# Patient Record
Sex: FEMALE | Race: WHITE | NOT HISPANIC OR LATINO | URBAN - METROPOLITAN AREA
[De-identification: names, ages, dates, MRNs, and addresses within clinical notes are randomized per-mention and may not be internally consistent; named-entity substitution may affect disease eponyms.]

---

## 2021-05-03 ENCOUNTER — INPATIENT (INPATIENT)
Facility: HOSPITAL | Age: 81
LOS: 8 days | Discharge: HOME | DRG: 309 | End: 2021-05-12
Attending: INTERNAL MEDICINE | Admitting: INTERNAL MEDICINE
Payer: MEDICARE

## 2021-05-03 VITALS
SYSTOLIC BLOOD PRESSURE: 201 MMHG | DIASTOLIC BLOOD PRESSURE: 97 MMHG | WEIGHT: 250 LBS | OXYGEN SATURATION: 95 % | HEART RATE: 113 BPM | RESPIRATION RATE: 22 BRPM | HEIGHT: 67 IN

## 2021-05-03 DIAGNOSIS — I48.91 UNSPECIFIED ATRIAL FIBRILLATION: ICD-10-CM

## 2021-05-03 LAB
ALBUMIN SERPL ELPH-MCNC: 3.6 G/DL — SIGNIFICANT CHANGE UP (ref 3.3–5)
ALP SERPL-CCNC: 92 U/L — SIGNIFICANT CHANGE UP (ref 40–120)
ALT FLD-CCNC: 13 U/L — SIGNIFICANT CHANGE UP (ref 10–45)
ANION GAP SERPL CALC-SCNC: 18 MMOL/L — HIGH (ref 5–17)
APTT BLD: 28.2 SEC — SIGNIFICANT CHANGE UP (ref 27.5–35.5)
AST SERPL-CCNC: 18 U/L — SIGNIFICANT CHANGE UP (ref 10–40)
BASOPHILS # BLD AUTO: 0.02 K/UL — SIGNIFICANT CHANGE UP (ref 0–0.2)
BASOPHILS NFR BLD AUTO: 0.2 % — SIGNIFICANT CHANGE UP (ref 0–2)
BILIRUB SERPL-MCNC: 0.3 MG/DL — SIGNIFICANT CHANGE UP (ref 0.2–1.2)
BUN SERPL-MCNC: 17 MG/DL — SIGNIFICANT CHANGE UP (ref 7–23)
CALCIUM SERPL-MCNC: 9 MG/DL — SIGNIFICANT CHANGE UP (ref 8.4–10.5)
CHLORIDE SERPL-SCNC: 100 MMOL/L — SIGNIFICANT CHANGE UP (ref 96–108)
CK MB CFR SERPL CALC: 1.9 NG/ML — SIGNIFICANT CHANGE UP (ref 0–3.8)
CK SERPL-CCNC: 62 U/L — SIGNIFICANT CHANGE UP (ref 25–170)
CO2 SERPL-SCNC: 20 MMOL/L — LOW (ref 22–31)
CREAT SERPL-MCNC: 0.73 MG/DL — SIGNIFICANT CHANGE UP (ref 0.5–1.3)
EOSINOPHIL # BLD AUTO: 0.04 K/UL — SIGNIFICANT CHANGE UP (ref 0–0.5)
EOSINOPHIL NFR BLD AUTO: 0.4 % — SIGNIFICANT CHANGE UP (ref 0–6)
GLUCOSE SERPL-MCNC: 117 MG/DL — HIGH (ref 70–99)
HCT VFR BLD CALC: 36.4 % — SIGNIFICANT CHANGE UP (ref 34.5–45)
HGB BLD-MCNC: 11.5 G/DL — SIGNIFICANT CHANGE UP (ref 11.5–15.5)
IMM GRANULOCYTES NFR BLD AUTO: 0.6 % — SIGNIFICANT CHANGE UP (ref 0–1.5)
INR BLD: 1.07 RATIO — SIGNIFICANT CHANGE UP (ref 0.88–1.16)
LIDOCAIN IGE QN: 21 U/L — SIGNIFICANT CHANGE UP (ref 7–60)
LYMPHOCYTES # BLD AUTO: 1.96 K/UL — SIGNIFICANT CHANGE UP (ref 1–3.3)
LYMPHOCYTES # BLD AUTO: 20.9 % — SIGNIFICANT CHANGE UP (ref 13–44)
MCHC RBC-ENTMCNC: 28.8 PG — SIGNIFICANT CHANGE UP (ref 27–34)
MCHC RBC-ENTMCNC: 31.6 GM/DL — LOW (ref 32–36)
MCV RBC AUTO: 91.2 FL — SIGNIFICANT CHANGE UP (ref 80–100)
MONOCYTES # BLD AUTO: 1.15 K/UL — HIGH (ref 0–0.9)
MONOCYTES NFR BLD AUTO: 12.3 % — SIGNIFICANT CHANGE UP (ref 2–14)
NEUTROPHILS # BLD AUTO: 6.15 K/UL — SIGNIFICANT CHANGE UP (ref 1.8–7.4)
NEUTROPHILS NFR BLD AUTO: 65.6 % — SIGNIFICANT CHANGE UP (ref 43–77)
NRBC # BLD: 0 /100 WBCS — SIGNIFICANT CHANGE UP (ref 0–0)
PLATELET # BLD AUTO: 285 K/UL — SIGNIFICANT CHANGE UP (ref 150–400)
POTASSIUM SERPL-MCNC: 4.2 MMOL/L — SIGNIFICANT CHANGE UP (ref 3.5–5.3)
POTASSIUM SERPL-SCNC: 4.2 MMOL/L — SIGNIFICANT CHANGE UP (ref 3.5–5.3)
PROT SERPL-MCNC: 7.6 G/DL — SIGNIFICANT CHANGE UP (ref 6–8.3)
PROTHROM AB SERPL-ACNC: 12.8 SEC — SIGNIFICANT CHANGE UP (ref 10.6–13.6)
RBC # BLD: 3.99 M/UL — SIGNIFICANT CHANGE UP (ref 3.8–5.2)
RBC # FLD: 13.6 % — SIGNIFICANT CHANGE UP (ref 10.3–14.5)
SARS-COV-2 RNA SPEC QL NAA+PROBE: SIGNIFICANT CHANGE UP
SODIUM SERPL-SCNC: 138 MMOL/L — SIGNIFICANT CHANGE UP (ref 135–145)
TROPONIN T, HIGH SENSITIVITY RESULT: 12 NG/L — SIGNIFICANT CHANGE UP (ref 0–51)
TROPONIN T, HIGH SENSITIVITY RESULT: 13 NG/L — SIGNIFICANT CHANGE UP (ref 0–51)
TROPONIN T, HIGH SENSITIVITY RESULT: 13 NG/L — SIGNIFICANT CHANGE UP (ref 0–51)
TSH SERPL-MCNC: 1.59 UIU/ML — SIGNIFICANT CHANGE UP (ref 0.27–4.2)
WBC # BLD: 9.38 K/UL — SIGNIFICANT CHANGE UP (ref 3.8–10.5)
WBC # FLD AUTO: 9.38 K/UL — SIGNIFICANT CHANGE UP (ref 3.8–10.5)

## 2021-05-03 PROCEDURE — 99285 EMERGENCY DEPT VISIT HI MDM: CPT | Mod: CS

## 2021-05-03 PROCEDURE — 71045 X-RAY EXAM CHEST 1 VIEW: CPT | Mod: 26

## 2021-05-03 PROCEDURE — 93010 ELECTROCARDIOGRAM REPORT: CPT

## 2021-05-03 RX ORDER — ATORVASTATIN CALCIUM 80 MG/1
20 TABLET, FILM COATED ORAL AT BEDTIME
Refills: 0 | Status: DISCONTINUED | OUTPATIENT
Start: 2021-05-03 | End: 2021-05-12

## 2021-05-03 RX ORDER — METOPROLOL TARTRATE 50 MG
50 TABLET ORAL THREE TIMES A DAY
Refills: 0 | Status: DISCONTINUED | OUTPATIENT
Start: 2021-05-03 | End: 2021-05-08

## 2021-05-03 RX ORDER — METFORMIN HYDROCHLORIDE 850 MG/1
1 TABLET ORAL
Qty: 0 | Refills: 0 | DISCHARGE

## 2021-05-03 RX ORDER — ROSUVASTATIN CALCIUM 5 MG/1
1 TABLET ORAL
Qty: 0 | Refills: 0 | DISCHARGE

## 2021-05-03 RX ORDER — DULOXETINE HYDROCHLORIDE 30 MG/1
60 CAPSULE, DELAYED RELEASE ORAL DAILY
Refills: 0 | Status: DISCONTINUED | OUTPATIENT
Start: 2021-05-03 | End: 2021-05-12

## 2021-05-03 RX ORDER — LEVOTHYROXINE SODIUM 125 MCG
50 TABLET ORAL DAILY
Refills: 0 | Status: DISCONTINUED | OUTPATIENT
Start: 2021-05-03 | End: 2021-05-12

## 2021-05-03 RX ORDER — METOPROLOL TARTRATE 50 MG
25 TABLET ORAL
Refills: 0 | Status: DISCONTINUED | OUTPATIENT
Start: 2021-05-03 | End: 2021-05-03

## 2021-05-03 RX ORDER — FAMOTIDINE 10 MG/ML
20 INJECTION INTRAVENOUS ONCE
Refills: 0 | Status: COMPLETED | OUTPATIENT
Start: 2021-05-03 | End: 2021-05-03

## 2021-05-03 RX ORDER — ENOXAPARIN SODIUM 100 MG/ML
110 INJECTION SUBCUTANEOUS
Refills: 0 | Status: DISCONTINUED | OUTPATIENT
Start: 2021-05-03 | End: 2021-05-06

## 2021-05-03 RX ORDER — DOCUSATE SODIUM 100 MG
1 CAPSULE ORAL
Qty: 0 | Refills: 0 | DISCHARGE

## 2021-05-03 RX ORDER — ASPIRIN/CALCIUM CARB/MAGNESIUM 324 MG
81 TABLET ORAL ONCE
Refills: 0 | Status: COMPLETED | OUTPATIENT
Start: 2021-05-03 | End: 2021-05-03

## 2021-05-03 RX ORDER — ASPIRIN/CALCIUM CARB/MAGNESIUM 324 MG
81 TABLET ORAL DAILY
Refills: 0 | Status: DISCONTINUED | OUTPATIENT
Start: 2021-05-03 | End: 2021-05-12

## 2021-05-03 RX ADMIN — Medication 50 MILLIGRAM(S): at 22:20

## 2021-05-03 RX ADMIN — FAMOTIDINE 20 MILLIGRAM(S): 10 INJECTION INTRAVENOUS at 12:38

## 2021-05-03 RX ADMIN — Medication 81 MILLIGRAM(S): at 12:38

## 2021-05-03 RX ADMIN — ATORVASTATIN CALCIUM 20 MILLIGRAM(S): 80 TABLET, FILM COATED ORAL at 22:20

## 2021-05-03 RX ADMIN — Medication 0.5 MILLIGRAM(S): at 14:56

## 2021-05-03 RX ADMIN — Medication 30 MILLILITER(S): at 12:38

## 2021-05-03 NOTE — CONSULT NOTE ADULT - SUBJECTIVE AND OBJECTIVE BOX
CHIEF COMPLAINT:Patient is a 81y old  Female who presents with a chief complaint of Anxiety with rapid heart rate. (03 May 2021 15:57)      HPI:  82 yo female PMH htn, hld, dm, anxiety, hypothyroidism presenting with feeling of anxiety and chest discomfort starting yesterday. Pt states she may have had spicy food and afterwards started to feel gas-like pressure in the center of her chest which would resolve with burping, however sensation would return. Today pt also felt as if she had a panic attack which she describes as a feeling as if she needs to go to sleep and lay down but unable to 2/2 racing thoughts. No cardiac workup in the past. Pt otherwise feels well no active CP currently. took on baby aspirin today. Pt states shes mostly concerned about her anxiety which she usually feels daily and has been worse over the past 6 months. Pt follows with psychiatrist and pcp regularly. no fevers, chills, nausea, vomiting, abdominal pain, diarrhea, constipation.       PAST MEDICAL & SURGICAL HISTORY:      MEDICATIONS  (STANDING):  aspirin enteric coated 81 milliGRAM(s) Oral daily  atorvastatin 20 milliGRAM(s) Oral at bedtime  DULoxetine 60 milliGRAM(s) Oral daily  enoxaparin Injectable 110 milliGRAM(s) SubCutaneous two times a day  levothyroxine 50 MICROGram(s) Oral daily  metoprolol tartrate 50 milliGRAM(s) Oral three times a day    MEDICATIONS  (PRN):      FAMILY HISTORY:      SOCIAL HISTORY:    [ ] Non-smoker  [ ] Smoker  [ ] Alcohol    Allergies    Allergy Status Unknown    Intolerances    	    REVIEW OF SYSTEMS:  CONSTITUTIONAL: No fever, weight loss, or fatigue  EYES: No eye pain, visual disturbances, or discharge  ENT:  No difficulty hearing, tinnitus, vertigo; No sinus or throat pain  NECK: No pain or stiffness  RESPIRATORY: No cough, wheezing, chills or hemoptysis; + Shortness of Breath  CARDIOVASCULAR: No chest pain, palpitations, passing out, dizziness, or leg swelling  GASTROINTESTINAL: No abdominal or epigastric pain. No nausea, vomiting, or hematemesis; No diarrhea or constipation. No melena or hematochezia.  GENITOURINARY: No dysuria, frequency, hematuria, or incontinence  NEUROLOGICAL: No headaches, memory loss, loss of strength, numbness, or tremors  SKIN: No itching, burning, rashes, or lesions   LYMPH Nodes: No enlarged glands  ENDOCRINE: No heat or cold intolerance; No hair loss  MUSCULOSKELETAL: No joint pain or swelling; No muscle, back, or extremity pain  PSYCHIATRIC: No depression, anxiety, mood swings, or difficulty sleeping  HEME/LYMPH: No easy bruising, or bleeding gums  ALLERGY AND IMMUNOLOGIC: No hives or eczema	    [ ] All others negative	  [ ] Unable to obtain    PHYSICAL EXAM:  T(C): 36.9 (05-03-21 @ 14:10), Max: 36.9 (05-03-21 @ 14:10)  HR: 91 (05-03-21 @ 14:10) (91 - 113)  BP: 131/63 (05-03-21 @ 14:10) (131/63 - 201/97)  RR: 20 (05-03-21 @ 14:10) (20 - 22)  SpO2: 98% (05-03-21 @ 14:10) (95% - 98%)  Wt(kg): --  I&O's Summary      Appearance: Normal	  HEENT:   Normal oral mucosa, PERRL, EOMI	  Lymphatic: No lymphadenopathy  Cardiovascular: Normal S1 S2, No JVD, + murmurs, No edema  Respiratory: Lungs clear to auscultation	  Psychiatry: A & O x 3, Mood & affect appropriate  Gastrointestinal:  Soft, Non-tender, + BS	  Skin: No rashes, No ecchymoses, No cyanosis	  Neurologic: Non-focal  Extremities: Normal range of motion, No clubbing, cyanosis or edema  Vascular: Peripheral pulses palpable 2+ bilaterally    TELEMETRY: 	    ECG:  	  RADIOLOGY:  OTHER: 	  	  LABS:	 	    CARDIAC MARKERS:                              11.5   9.38  )-----------( 285      ( 03 May 2021 13:03 )             36.4     05-03    138  |  100  |  17  ----------------------------<  117<H>  4.2   |  20<L>  |  0.73    Ca    9.0      03 May 2021 13:03  Mg     1.7     05-03    TPro  7.6  /  Alb  3.6  /  TBili  0.3  /  DBili  x   /  AST  18  /  ALT  13  /  AlkPhos  92  05-03    proBNP:   Lipid Profile:   HgA1c:   TSH:   PT/INR - ( 03 May 2021 13:03 )   PT: 12.8 sec;   INR: 1.07 ratio         PTT - ( 03 May 2021 13:03 )  PTT:28.2 sec    PREVIOUS DIAGNOSTIC TESTING:    :  < from: 12 Lead ECG (10.12.15 @ 10:11) >  Diagnosis Line NORMAL SINUS RHYTHM WITH SINUS ARRHYTHMIA  CANNOTRULE OUT ANTERIOR INFARCT , AGE UNDETERMINED  ABNORMAL ECG    < from: Xray Chest 1 View- PORTABLE-Urgent (Xray Chest 1 View- PORTABLE-Urgent .) (05.03.21 @ 14:03) >  The heart is normal in size. Lungs are clear. No pleural effusion. No pneumothorax. Degenerative changes of the thoracic spine.    < end of copied text >

## 2021-05-03 NOTE — H&P ADULT - ASSESSMENT
82 yo female PMH htn, hld, dm, anxiety, hypothyroidism presenting with feeling of anxiety and chest discomfort starting yesterday. Pt states she may have had spicy food and afterwards started to feel gas-like pressure in the center of her chest which would resolve with burping, however sensation would return. Today pt also felt as if she had a panic attack which she describes as a feeling as if she needs to go to sleep and lay down but unable to 2/2 racing thoughts. No cardiac workup in the past. Pt otherwise feels well no active CP currently. took on baby aspirin today. Pt states shes mostly concerned about her anxiety which she usually feels daily and has been worse over the past 6 months. Pt follows with psychiatrist and pcp regularly. no fevers, chills, nausea, vomiting, abdominal pain, diarrhea, constipation.    CV:  82 yo female PMH htn, hld, dm, anxiety, hypothyroidism presenting with feeling of anxiety and chest discomfort starting yesterday. Pt states she may have had spicy food and afterwards started to feel gas-like pressure in the center of her chest which would resolve with burping, however sensation would return. Today pt also felt as if she had a panic attack which she describes as a feeling as if she needs to go to sleep and lay down but unable to 2/2 racing thoughts. No cardiac workup in the past. Pt otherwise feels well no active CP currently. took on baby aspirin today. Pt states shes mostly concerned about her anxiety which she usually feels daily and has been worse over the past 6 months. Pt follows with psychiatrist and pcp regularly. no fevers, chills, nausea, vomiting, abdominal pain, diarrhea, constipation.    CV: New onset A.fib. Admit to tele. CHADVAC score 3. Will start Lovenox 110 mg bid, transition to NOAC later. TTE tomorrow, then   resting stress test next day. Initial trop negative x 2, repeat in AM. Hold ACE, increase Lopressor to 50 mg tid, continue   ASA 81 mg/day and Crestor 5 mg/day. Lipid panel in AM.      Endo: A1C in AM, hold metformen in hosptal, continue Synthyroid 50 mcg/day, TSH in AM.  Finger stick AC and HS.

## 2021-05-03 NOTE — H&P ADULT - NSHPPHYSICALEXAM_GEN_ALL_CORE
PHYSICAL EXAMINATION:  Vital Signs Last 24 Hrs  T(C): 36.9 (03 May 2021 14:10), Max: 36.9 (03 May 2021 14:10)  T(F): 98.4 (03 May 2021 14:10), Max: 98.4 (03 May 2021 14:10)  HR: 91 (03 May 2021 14:10) (91 - 113)  BP: 131/63 (03 May 2021 14:10) (131/63 - 201/97)  BP(mean): --  RR: 20 (03 May 2021 14:10) (20 - 22)  SpO2: 98% (03 May 2021 14:10) (95% - 98%)  CAPILLARY BLOOD GLUCOSE          GENERAL: NAD, well-groomed, well-developed  HEAD:  atraumatic, normocephalic  EYES: sclera anicteric  ENMT: mucous membranes moist  NECK: supple, No JVD  CHEST/LUNG: clear to auscultation bilaterally; no rales, rhonchi, or wheezing b/l  HEART: normal S1, S2  ABDOMEN: BS+, soft, ND, NT   EXTREMITIES:  pulses palpable; no clubbing, cyanosis, or edema b/l LEs  NEURO: awake, alert, interactive; moves all extremities  SKIN: no rashes or lesions PHYSICAL EXAMINATION:  Vital Signs Last 24 Hrs  T(C): 36.9 (03 May 2021 14:10), Max: 36.9 (03 May 2021 14:10)  T(F): 98.4 (03 May 2021 14:10), Max: 98.4 (03 May 2021 14:10)  HR: 91 (03 May 2021 14:10) (91 - 113)  BP: 131/63 (03 May 2021 14:10) (131/63 - 201/97)  BP(mean): --  RR: 20 (03 May 2021 14:10) (20 - 22)  SpO2: 98% (03 May 2021 14:10) (95% - 98%)  CAPILLARY BLOOD GLUCOSE          GENERAL: NAD, well-groomed, well-developed, seen in ER, comfortable, no CP or SOB  HEAD:  atraumatic, normocephalic  EYES: sclera anicteric  ENMT: mucous membranes moist  NECK: supple, No JVD  CHEST/LUNG: clear to auscultation bilaterally; no rales, rhonchi, or wheezing b/l  HEART: normal S1, S2  ABDOMEN: BS+, soft, ND, NT   EXTREMITIES:  pulses palpable; no clubbing, cyanosis, or edema b/l LEs  NEURO: awake, alert, interactive; moves all extremities  SKIN: no rashes or lesions

## 2021-05-03 NOTE — CONSULT NOTE ADULT - ASSESSMENT
82 yo female PMH htn, hld, dm, anxiety, hypothyroidism presenting with feeling of anxiety and chest discomfort starting yesterday. Pt states she may have had spicy food and afterwards started to feel gas-like pressure in the center of her chest which would resolve with burping, however sensation would return. Today pt also felt as if she had a panic attack which she describes as a feeling as if she needs to go to sleep and lay down but unable to 2/2 racing thoughts. No cardiac workup in the past. Pt otherwise feels well no active CP currently. took on baby aspirin today. Pt states shes mostly concerned about her anxiety which she usually feels daily and has been worse over the past 6 months. Pt follows with psychiatrist and pcp regularly. no fevers, chills, nausea, vomiting, abdominal pain, diarrhea, constipation.   pt with sig cardiac risk factor with hx of htn increase cholesterol with palpitation and new onset of a.fib.  tele  beta blocker to control hr  tsh  lipid panel  cardiac enzymes  echo  will consider ischemia work up based on echo results

## 2021-05-03 NOTE — ED PROVIDER NOTE - ATTENDING CONTRIBUTION TO CARE
RGUJRAL 80yo f hx listed presents with chest pain and anxiety. States she has history of anxiety and has increased symptoms x 1 week. Pt also reports chest tightness and belching. No SOB, palpitations. Denies any thoughts of SI/HI. Non smoker, denies alcohol. Pt denies history of atrial fibrillation.  On exam, Patient is awake, alert and oriented x 3.  Patient is well appearing and in no acute distress.  NCAT  Neck is supple  Lungs are CTA B/L,+S1S2 no murmurs,  Abdomen:Soft nd/obese/nt+bs no rebound or guarding.  Extremity no edema or calf tender.  Skin with no rash.  Neuro CN3-12 intact. Strength 5/5 in upper and lower extremities. Nml Sensation.  Patient w new onset afib. No prior cardiac work up. Check labs, eval for ACS, arrhythmia.   Discussed with patient to start anticoagulation, pt declined any "blood thinners" at this time. States she will only take aspirin at this time. Risk discussed including cva. Pt states she will reconsider during admission.

## 2021-05-03 NOTE — ED ADULT NURSE NOTE - OBJECTIVE STATEMENT
81 yr old female to ED via EMS with c/o feeling worsening anxiety x 6 months with assoc "gas like chest " epigastric rad upwards. Relieved with belching. Reoccurring and intermittent. Pt awake alert and orientedx3 Resp even and nonlab Denies SOB. Sees a psychiatrist on Cymbalta, Denies SI. Anxiety starting at 5-6 am every morning. Talked with psych to poss increase Cymbalta. Has H/o Hypothyroid HTN HLD DM2 on Metformin.

## 2021-05-03 NOTE — ED PROVIDER NOTE - OBJECTIVE STATEMENT
80 yo female with pmh htn, hld, dm, anxiety presenting with feeling of anxiety and chest discomfort starting yesterday. 82 yo female with pmh htn, hld, dm, anxiety presenting with feeling of anxiety and chest discomfort starting yesterday. Pt states she may have had spicy food and afterwards started to feel gas-like pressure in the center of her chest which would resolve with burping, however 82 yo female with pmh htn, hld, dm, anxiety, hypothyroidism presenting with feeling of anxiety and chest discomfort starting yesterday. Pt states she may have had spicy food and afterwards started to feel gas-like pressure in the center of her chest which would resolve with burping, however sensation would return. Today pt also felt as if she had a panic attack which she describes as a feeling as if she needs to go to sleep and lay down but unable to 2/2 racing thoughts. No si/hi. no cardiac workup in the past. Pt otherwise feels well no active CP currently. took on baby aspirin today. Pt states shes mostly concerned about her anxiety which she usually feels daily and has been worse over the past 6 months. Pt follows with psychiatrist and pcp regularly. no fevers, chills, nausea, vomiting, abdominal pain, diarrhea, constipation.

## 2021-05-03 NOTE — ED ADULT NURSE REASSESSMENT NOTE - NS ED NURSE REASSESS COMMENT FT1
Handoff report received from MARIA ISABEL Marte. Pt resting comfortably in red 39. Pt A&O x 4, VSS. Pt in afib on cardiac monitor. Bed in lowest position, side rails up and Handoff report received from MARIA ISABEL Marte. Pt resting comfortably in red 39. Pt A&O x 4, VSS. Pt in afib on cardiac monitor. Pt denies SOB and chest pain. Pt denies pain and any needs at this time. Bed in lowest position, side rails up and call bell in reach. Pt instructed to ring call bell if anything is needed.

## 2021-05-03 NOTE — H&P ADULT - HISTORY OF PRESENT ILLNESS
82 yo female PMH htn, hld, dm, anxiety, hypothyroidism presenting with feeling of anxiety and chest discomfort starting yesterday. Pt states she may have had spicy food and afterwards started to feel gas-like pressure in the center of her chest which would resolve with burping, however sensation would return. Today pt also felt as if she had a panic attack which she describes as a feeling as if she needs to go to sleep and lay down but unable to 2/2 racing thoughts. No cardiac workup in the past. Pt otherwise feels well no active CP currently. took on baby aspirin today. Pt states shes mostly concerned about her anxiety which she usually feels daily and has been worse over the past 6 months. Pt follows with psychiatrist and pcp regularly. no fevers, chills, nausea, vomiting, abdominal pain, diarrhea, constipation.

## 2021-05-03 NOTE — H&P ADULT - NSHPLABSRESULTS_GEN_ALL_CORE
LABS:                        11.5   9.38  )-----------( 285      ( 03 May 2021 13:03 )             36.4     05-03    138  |  100  |  17  ----------------------------<  117<H>  4.2   |  20<L>  |  0.73    Ca    9.0      03 May 2021 13:03  Mg     1.7     05-03    TPro  7.6  /  Alb  3.6  /  TBili  0.3  /  DBili  x   /  AST  18  /  ALT  13  /  AlkPhos  92  05-03    PT/INR - ( 03 May 2021 13:03 )   PT: 12.8 sec;   INR: 1.07 ratio         PTT - ( 03 May 2021 13:03 )  PTT:28.2 sec        RADIOLOGY & ADDITIONAL TESTS:

## 2021-05-04 LAB
A1C WITH ESTIMATED AVERAGE GLUCOSE RESULT: 5.8 % — HIGH (ref 4–5.6)
CHOLEST SERPL-MCNC: 120 MG/DL — SIGNIFICANT CHANGE UP
COVID-19 SPIKE DOMAIN AB INTERP: POSITIVE
COVID-19 SPIKE DOMAIN ANTIBODY RESULT: >250 U/ML — HIGH
ESTIMATED AVERAGE GLUCOSE: 120 MG/DL — HIGH (ref 68–114)
GLUCOSE BLDC GLUCOMTR-MCNC: 120 MG/DL — HIGH (ref 70–99)
GLUCOSE BLDC GLUCOMTR-MCNC: 121 MG/DL — HIGH (ref 70–99)
GLUCOSE BLDC GLUCOMTR-MCNC: 130 MG/DL — HIGH (ref 70–99)
GLUCOSE BLDC GLUCOMTR-MCNC: 153 MG/DL — HIGH (ref 70–99)
HDLC SERPL-MCNC: 40 MG/DL — LOW
LIPID PNL WITH DIRECT LDL SERPL: 67 MG/DL — SIGNIFICANT CHANGE UP
NON HDL CHOLESTEROL: 80 MG/DL — SIGNIFICANT CHANGE UP
SARS-COV-2 IGG+IGM SERPL QL IA: >250 U/ML — HIGH
SARS-COV-2 IGG+IGM SERPL QL IA: POSITIVE
TRIGL SERPL-MCNC: 63 MG/DL — SIGNIFICANT CHANGE UP
TROPONIN T, HIGH SENSITIVITY RESULT: 12 NG/L — SIGNIFICANT CHANGE UP (ref 0–51)
TSH SERPL-MCNC: 1.88 UIU/ML — SIGNIFICANT CHANGE UP (ref 0.27–4.2)

## 2021-05-04 PROCEDURE — 71275 CT ANGIOGRAPHY CHEST: CPT | Mod: 26

## 2021-05-04 PROCEDURE — 93306 TTE W/DOPPLER COMPLETE: CPT | Mod: 26

## 2021-05-04 RX ADMIN — ENOXAPARIN SODIUM 110 MILLIGRAM(S): 100 INJECTION SUBCUTANEOUS at 05:38

## 2021-05-04 RX ADMIN — Medication 50 MICROGRAM(S): at 05:38

## 2021-05-04 RX ADMIN — ATORVASTATIN CALCIUM 20 MILLIGRAM(S): 80 TABLET, FILM COATED ORAL at 21:41

## 2021-05-04 RX ADMIN — Medication 50 MILLIGRAM(S): at 21:41

## 2021-05-04 RX ADMIN — Medication 50 MILLIGRAM(S): at 14:32

## 2021-05-04 RX ADMIN — DULOXETINE HYDROCHLORIDE 60 MILLIGRAM(S): 30 CAPSULE, DELAYED RELEASE ORAL at 14:32

## 2021-05-04 RX ADMIN — ENOXAPARIN SODIUM 110 MILLIGRAM(S): 100 INJECTION SUBCUTANEOUS at 17:18

## 2021-05-04 RX ADMIN — Medication 81 MILLIGRAM(S): at 14:32

## 2021-05-04 RX ADMIN — Medication 50 MILLIGRAM(S): at 05:37

## 2021-05-04 NOTE — PROGRESS NOTE ADULT - ASSESSMENT
82 yo female   PMH htn, hld, dm, anxiety, hypothyroidism   presenting with feeling of anxiety and cp       felt as if she had a panic attack No cardiac workup in the past.     : New onset A.fib.      tele. CHADVAC score 3.     on   Lovenox 110 mg bid, transition to NOAC later     await  echo/ card lenin   tele, pt in NSR now  DM, follow  fs   on  Synthroid  50 mcg  80 yo female   PMH htn, hld, dm, anxiety, hypothyroidism   presenting with feeling of anxiety and cp       felt as if she had a panic attack No cardiac workup in the past.     : New onset A.fib.      tele. CHADVAC score 3.     on   Lovenox 110 mg bid, transition to NOAC later     await  echo/ card lenin   tele, pt in NSR now  DM, follow  fs   on  Synthroid  50 mcg   CT  angio chest/  echo pending  defer ischemic w/p to card

## 2021-05-04 NOTE — PROGRESS NOTE ADULT - SUBJECTIVE AND OBJECTIVE BOX
CARDIOLOGY     PROGRESS  NOTE   ________________________________________________    CHIEF COMPLAINT:Patient is a 81y old  Female who presents with a chief complaint of Anxiety with rapid heart rate. (04 May 2021 07:39)  no complain.  	  REVIEW OF SYSTEMS:  CONSTITUTIONAL: No fever, weight loss, or fatigue  EYES: No eye pain, visual disturbances, or discharge  ENT:  No difficulty hearing, tinnitus, vertigo; No sinus or throat pain  NECK: No pain or stiffness  RESPIRATORY: No cough, wheezing, chills or hemoptysis; No Shortness of Breath  CARDIOVASCULAR: No chest pain, palpitations, passing out, dizziness, or leg swelling  GASTROINTESTINAL: No abdominal or epigastric pain. No nausea, vomiting, or hematemesis; No diarrhea or constipation. No melena or hematochezia.  GENITOURINARY: No dysuria, frequency, hematuria, or incontinence  NEUROLOGICAL: No headaches, memory loss, loss of strength, numbness, or tremors  SKIN: No itching, burning, rashes, or lesions   LYMPH Nodes: No enlarged glands  ENDOCRINE: No heat or cold intolerance; No hair loss  MUSCULOSKELETAL: No joint pain or swelling; No muscle, back, or extremity pain  PSYCHIATRIC: No depression, anxiety, mood swings, or difficulty sleeping  HEME/LYMPH: No easy bruising, or bleeding gums  ALLERGY AND IMMUNOLOGIC: No hives or eczema	    [ ] All others negative	  [ ] Unable to obtain    PHYSICAL EXAM:  T(C): 36.8 (05-04-21 @ 04:42), Max: 37 (05-03-21 @ 22:04)  HR: 100 (05-04-21 @ 04:42) (91 - 113)  BP: 142/78 (05-04-21 @ 04:42) (131/63 - 201/97)  RR: 19 (05-04-21 @ 04:42) (19 - 22)  SpO2: 94% (05-04-21 @ 04:42) (94% - 98%)  Wt(kg): --  I&O's Summary    03 May 2021 07:01  -  04 May 2021 07:00  --------------------------------------------------------  IN: 240 mL / OUT: 600 mL / NET: -360 mL        Appearance: Normal	  HEENT:   Normal oral mucosa, PERRL, EOMI	  Lymphatic: No lymphadenopathy  Cardiovascular: Normal S1 S2, No JVD, + murmurs, No edema  Respiratory: Lungs clear to auscultation	  Psychiatry: A & O x 3, Mood & affect appropriate  Gastrointestinal:  Soft, Non-tender, + BS	  Skin: No rashes, No ecchymoses, No cyanosis	  Neurologic: Non-focal  Extremities: Normal range of motion, No clubbing, cyanosis or edema  Vascular: Peripheral pulses palpable 2+ bilaterally    MEDICATIONS  (STANDING):  aspirin enteric coated 81 milliGRAM(s) Oral daily  atorvastatin 20 milliGRAM(s) Oral at bedtime  DULoxetine 60 milliGRAM(s) Oral daily  enoxaparin Injectable 110 milliGRAM(s) SubCutaneous two times a day  levothyroxine 50 MICROGram(s) Oral daily  metoprolol tartrate 50 milliGRAM(s) Oral three times a day      TELEMETRY: 	    ECG:  	  RADIOLOGY:  OTHER: 	  	  LABS:	 	    CARDIAC MARKERS:  CARDIAC MARKERS ( 03 May 2021 19:53 )  x     / x     / 62 U/L / x     / 1.9 ng/mL                                11.5   9.38  )-----------( 285      ( 03 May 2021 13:03 )             36.4     05-03    138  |  100  |  17  ----------------------------<  117<H>  4.2   |  20<L>  |  0.73    Ca    9.0      03 May 2021 13:03  Mg     1.7     05-03    TPro  7.6  /  Alb  3.6  /  TBili  0.3  /  DBili  x   /  AST  18  /  ALT  13  /  AlkPhos  92  05-03    proBNP:   Lipid Profile:   HgA1c:   TSH: Thyroid Stimulating Hormone, Serum: 1.59 uIU/mL (05-03 @ 20:45)    PT/INR - ( 03 May 2021 13:03 )   PT: 12.8 sec;   INR: 1.07 ratio         PTT - ( 03 May 2021 13:03 )  PTT:28.2 sec  Troponin T, High Sensitivity (05.04.21 @ 05:57)    Troponin T, High Sensitivity Result: 12: *  *  Rapid upward or downward changes in high-sensitivity troponin levels  suggest acute myocardial injury. Renal impairment may cause sustained  troponin elevations.  Normal: <6 - 14 ng/L  Indeterminate: 15-51 ng/L  Elevated: > 51 ng/L  See http://labs/test/TROPTHS on the Health system intranet for more  information  Specimen not hemolyzed ng/L        Assessment and plan  ---------------------------  80 yo female PMH htn, hld, dm, anxiety, hypothyroidism presenting with feeling of anxiety and chest discomfort starting yesterday. Pt states she may have had spicy food and afterwards started to feel gas-like pressure in the center of her chest which would resolve with burping, however sensation would return. Today pt also felt as if she had a panic attack which she describes as a feeling as if she needs to go to sleep and lay down but unable to 2/2 racing thoughts. No cardiac workup in the past. Pt otherwise feels well no active CP currently. took on baby aspirin today. Pt states shes mostly concerned about her anxiety which she usually feels daily and has been worse over the past 6 months. Pt follows with psychiatrist and pcp regularly. no fevers, chills, nausea, vomiting, abdominal pain, diarrhea, constipation.   pt with sig cardiac risk factor with hx of htn increase cholesterol with palpitation and new onset of a.fib.  tele noted converted to NSR  beta blocker to control hr, continue 50 mg tid  tsh  lipid panel  cardiac enzymes are all negative  echo  will consider ischemia work up based on echo results  cta r/o PE  continue AC

## 2021-05-04 NOTE — PATIENT PROFILE ADULT - STATED REASON FOR ADMISSION
"It started out as a bad panic attack, I was in my kitchen and I felt like I was going to pass out and said something was wrong. The last few days I was so tired and weak, it turns out I have afib."

## 2021-05-04 NOTE — PROGRESS NOTE ADULT - SUBJECTIVE AND OBJECTIVE BOX
REVIEW OF SYSTEMS:  GEN: no fever,    no chills  RESP: no SOB,   no cough  CVS: no chest pain,   no palpitations  GI: no abdominal pain,   no nausea,   no vomiting,   no constipation,   no diarrhea  : no dysuria,   no frequency  NEURO: no headache,   no dizziness  PSYCH: no depression,   not anxious  Derm : no rash    MEDICATIONS  (STANDING):  aspirin enteric coated 81 milliGRAM(s) Oral daily  atorvastatin 20 milliGRAM(s) Oral at bedtime  DULoxetine 60 milliGRAM(s) Oral daily  enoxaparin Injectable 110 milliGRAM(s) SubCutaneous two times a day  levothyroxine 50 MICROGram(s) Oral daily  metoprolol tartrate 50 milliGRAM(s) Oral three times a day    MEDICATIONS  (PRN):      Vital Signs Last 24 Hrs  T(C): 36.8 (04 May 2021 04:42), Max: 37 (03 May 2021 22:04)  T(F): 98.2 (04 May 2021 04:42), Max: 98.6 (03 May 2021 22:04)  HR: 100 (04 May 2021 04:42) (91 - 113)  BP: 142/78 (04 May 2021 04:42) (131/63 - 201/97)  BP(mean): --  RR: 19 (04 May 2021 04:42) (19 - 22)  SpO2: 94% (04 May 2021 04:42) (94% - 98%)  CAPILLARY BLOOD GLUCOSE        I&O's Summary    03 May 2021 07:01  -  04 May 2021 07:00  --------------------------------------------------------  IN: 240 mL / OUT: 600 mL / NET: -360 mL        PHYSICAL EXAM:  HEAD:  Atraumatic, Normocephalic  NECK: Supple, No   JVD  CHEST/LUNG:   no     rales,     no,    rhonchi  HEART: Regular rate and rhythm;         murmur  ABDOMEN: Soft, Nontender, ;   EXTREMITIES:        edema  NEUROLOGY:  alert    LABS:                        11.5   9.38  )-----------( 285      ( 03 May 2021 13:03 )             36.4     05-03    138  |  100  |  17  ----------------------------<  117<H>  4.2   |  20<L>  |  0.73    Ca    9.0      03 May 2021 13:03  Mg     1.7     05-03    TPro  7.6  /  Alb  3.6  /  TBili  0.3  /  DBili  x   /  AST  18  /  ALT  13  /  AlkPhos  92  05-03    PT/INR - ( 03 May 2021 13:03 )   PT: 12.8 sec;   INR: 1.07 ratio         PTT - ( 03 May 2021 13:03 )  PTT:28.2 sec  CARDIAC MARKERS ( 03 May 2021 19:53 )  x     / x     / 62 U/L / x     / 1.9 ng/mL                Thyroid Stimulating Hormone, Serum: 1.59 uIU/mL (05-03 @ 20:45)          Consultant(s) Notes Reviewed:      Care Discussed with Consultants/Other Providers:      afebrile    REVIEW OF SYSTEMS:  GEN: no fever,    no chills  RESP: no SOB,   no cough  CVS: no chest pain,   no palpitations  GI: no abdominal pain,   no nausea,   no vomiting,   no constipation,   no diarrhea  : no dysuria,   no frequency  NEURO: no headache,   no dizziness  PSYCH: no depression,   not anxious  Derm : no rash    MEDICATIONS  (STANDING):  aspirin enteric coated 81 milliGRAM(s) Oral daily  atorvastatin 20 milliGRAM(s) Oral at bedtime  DULoxetine 60 milliGRAM(s) Oral daily  enoxaparin Injectable 110 milliGRAM(s) SubCutaneous two times a day  levothyroxine 50 MICROGram(s) Oral daily  metoprolol tartrate 50 milliGRAM(s) Oral three times a day    MEDICATIONS  (PRN):      Vital Signs Last 24 Hrs  T(C): 36.8 (04 May 2021 04:42), Max: 37 (03 May 2021 22:04)  T(F): 98.2 (04 May 2021 04:42), Max: 98.6 (03 May 2021 22:04)  HR: 100 (04 May 2021 04:42) (91 - 113)  BP: 142/78 (04 May 2021 04:42) (131/63 - 201/97)  BP(mean): --  RR: 19 (04 May 2021 04:42) (19 - 22)  SpO2: 94% (04 May 2021 04:42) (94% - 98%)  CAPILLARY BLOOD GLUCOSE        I&O's Summary    03 May 2021 07:01  -  04 May 2021 07:00  --------------------------------------------------------  IN: 240 mL / OUT: 600 mL / NET: -360 mL        PHYSICAL EXAM:  HEAD:  Atraumatic, Normocephalic  NECK: Supple, No   JVD  CHEST/LUNG:   no     rales,     no,    rhonchi  HEART: Regular rate and rhythm;         murmur  ABDOMEN: Soft, Nontender, ;   EXTREMITIES:    no    edema  NEUROLOGY:  alert    LABS:                        11.5   9.38  )-----------( 285      ( 03 May 2021 13:03 )             36.4     05-03    138  |  100  |  17  ----------------------------<  117<H>  4.2   |  20<L>  |  0.73    Ca    9.0      03 May 2021 13:03  Mg     1.7     05-03    TPro  7.6  /  Alb  3.6  /  TBili  0.3  /  DBili  x   /  AST  18  /  ALT  13  /  AlkPhos  92  05-03    PT/INR - ( 03 May 2021 13:03 )   PT: 12.8 sec;   INR: 1.07 ratio         PTT - ( 03 May 2021 13:03 )  PTT:28.2 sec  CARDIAC MARKERS ( 03 May 2021 19:53 )  x     / x     / 62 U/L / x     / 1.9 ng/mL                Thyroid Stimulating Hormone, Serum: 1.59 uIU/mL (05-03 @ 20:45)          Consultant(s) Notes Reviewed:      Care Discussed with Consultants/Other Providers:

## 2021-05-05 LAB
GLUCOSE BLDC GLUCOMTR-MCNC: 104 MG/DL — HIGH (ref 70–99)
GLUCOSE BLDC GLUCOMTR-MCNC: 112 MG/DL — HIGH (ref 70–99)
GLUCOSE BLDC GLUCOMTR-MCNC: 125 MG/DL — HIGH (ref 70–99)
GLUCOSE BLDC GLUCOMTR-MCNC: 135 MG/DL — HIGH (ref 70–99)

## 2021-05-05 RX ORDER — POLYETHYLENE GLYCOL 3350 17 G/17G
17 POWDER, FOR SOLUTION ORAL DAILY
Refills: 0 | Status: DISCONTINUED | OUTPATIENT
Start: 2021-05-05 | End: 2021-05-12

## 2021-05-05 RX ADMIN — ENOXAPARIN SODIUM 110 MILLIGRAM(S): 100 INJECTION SUBCUTANEOUS at 05:26

## 2021-05-05 RX ADMIN — Medication 81 MILLIGRAM(S): at 11:29

## 2021-05-05 RX ADMIN — Medication 50 MILLIGRAM(S): at 13:06

## 2021-05-05 RX ADMIN — ATORVASTATIN CALCIUM 20 MILLIGRAM(S): 80 TABLET, FILM COATED ORAL at 21:46

## 2021-05-05 RX ADMIN — DULOXETINE HYDROCHLORIDE 60 MILLIGRAM(S): 30 CAPSULE, DELAYED RELEASE ORAL at 11:29

## 2021-05-05 RX ADMIN — Medication 50 MILLIGRAM(S): at 05:25

## 2021-05-05 RX ADMIN — Medication 50 MILLIGRAM(S): at 21:46

## 2021-05-05 RX ADMIN — POLYETHYLENE GLYCOL 3350 17 GRAM(S): 17 POWDER, FOR SOLUTION ORAL at 17:36

## 2021-05-05 RX ADMIN — ENOXAPARIN SODIUM 110 MILLIGRAM(S): 100 INJECTION SUBCUTANEOUS at 17:17

## 2021-05-05 RX ADMIN — Medication 50 MICROGRAM(S): at 05:26

## 2021-05-05 NOTE — PROGRESS NOTE ADULT - SUBJECTIVE AND OBJECTIVE BOX
tele nsr  REVIEW OF SYSTEMS:  GEN: no fever,    no chills  RESP: no SOB,   no cough  CVS: no chest pain,   no palpitations  GI: no abdominal pain,   no nausea,   no vomiting,   no constipation,   no diarrhea  : no dysuria,   no frequency  NEURO: no headache,   no dizziness  PSYCH: no depression,   not anxious  Derm : no rash    MEDICATIONS  (STANDING):  aspirin enteric coated 81 milliGRAM(s) Oral daily  atorvastatin 20 milliGRAM(s) Oral at bedtime  DULoxetine 60 milliGRAM(s) Oral daily  enoxaparin Injectable 110 milliGRAM(s) SubCutaneous two times a day  levothyroxine 50 MICROGram(s) Oral daily  metoprolol tartrate 50 milliGRAM(s) Oral three times a day    MEDICATIONS  (PRN):      Vital Signs Last 24 Hrs  T(C): 36.7 (05 May 2021 04:40), Max: 36.7 (05 May 2021 04:40)  T(F): 98.1 (05 May 2021 04:40), Max: 98.1 (05 May 2021 04:40)  HR: 91 (05 May 2021 04:40) (86 - 97)  BP: 146/82 (05 May 2021 04:40) (119/68 - 150/72)  BP(mean): --  RR: 18 (05 May 2021 04:40) (18 - 18)  SpO2: 96% (05 May 2021 04:40) (94% - 98%)  CAPILLARY BLOOD GLUCOSE      POCT Blood Glucose.: 135 mg/dL (05 May 2021 07:14)  POCT Blood Glucose.: 153 mg/dL (04 May 2021 21:00)  POCT Blood Glucose.: 120 mg/dL (04 May 2021 16:27)  POCT Blood Glucose.: 130 mg/dL (04 May 2021 11:39)    I&O's Summary    04 May 2021 07:01  -  05 May 2021 07:00  --------------------------------------------------------  IN: 1010 mL / OUT: 300 mL / NET: 710 mL    05 May 2021 07:01  -  05 May 2021 10:07  --------------------------------------------------------  IN: 240 mL / OUT: 0 mL / NET: 240 mL        PHYSICAL EXAM:  HEAD:  Atraumatic, Normocephalic  NECK: Supple, No   JVD  CHEST/LUNG:   no     rales,     no,    rhonchi  HEART: Regular rate and rhythm;         murmur  ABDOMEN: Soft, Nontender, ;   EXTREMITIES:    no    edema  NEUROLOGY:  alert    LABS:                        11.5   9.38  )-----------( 285      ( 03 May 2021 13:03 )             36.4     05-03    138  |  100  |  17  ----------------------------<  117<H>  4.2   |  20<L>  |  0.73    Ca    9.0      03 May 2021 13:03  Mg     1.7     05-03    TPro  7.6  /  Alb  3.6  /  TBili  0.3  /  DBili  x   /  AST  18  /  ALT  13  /  AlkPhos  92  05-03    PT/INR - ( 03 May 2021 13:03 )   PT: 12.8 sec;   INR: 1.07 ratio         PTT - ( 03 May 2021 13:03 )  PTT:28.2 sec  CARDIAC MARKERS ( 03 May 2021 19:53 )  x     / x     / 62 U/L / x     / 1.9 ng/mL                Thyroid Stimulating Hormone, Serum: 1.88 uIU/mL (05-04 @ 09:19)          Consultant(s) Notes Reviewed:      Care Discussed with Consultants/Other Providers:

## 2021-05-05 NOTE — PROGRESS NOTE ADULT - ASSESSMENT
82 yo female   PMH htn, hld, dm, anxiety, hypothyroidism   presenting with feeling of anxiety and cp       felt as if she had a panic attack No cardiac workup in the past.     : New onset A.fib.,  now in nsr      tele. CHADVAC score 3.     on   Lovenox 110 mg bid, transition to NOAC later   tele, pt in NSR now  DM, follow  fs   on  Synthroid  50 mcg   CT  angio chest/, no PE/  echo pending  awiat stress test  defer ischemic w/p to card

## 2021-05-05 NOTE — PROGRESS NOTE ADULT - SUBJECTIVE AND OBJECTIVE BOX
CARDIOLOGY     PROGRESS  NOTE   ________________________________________________    CHIEF COMPLAINT:Patient is a 81y old  Female who presents with a chief complaint of Anxiety with rapid heart rate. (04 May 2021 07:57)  no complain.  	  REVIEW OF SYSTEMS:  CONSTITUTIONAL: No fever, weight loss, or fatigue  EYES: No eye pain, visual disturbances, or discharge  ENT:  No difficulty hearing, tinnitus, vertigo; No sinus or throat pain  NECK: No pain or stiffness  RESPIRATORY: No cough, wheezing, chills or hemoptysis; no  Shortness of Breath  CARDIOVASCULAR: No chest pain, palpitations, passing out, dizziness, or leg swelling  GASTROINTESTINAL: No abdominal or epigastric pain. No nausea, vomiting, or hematemesis; No diarrhea or constipation. No melena or hematochezia.  GENITOURINARY: No dysuria, frequency, hematuria, or incontinence  NEUROLOGICAL: No headaches, memory loss, loss of strength, numbness, or tremors  SKIN: No itching, burning, rashes, or lesions   LYMPH Nodes: No enlarged glands  ENDOCRINE: No heat or cold intolerance; No hair loss  MUSCULOSKELETAL: No joint pain or swelling; No muscle, back, or extremity pain  PSYCHIATRIC: No depression, anxiety, mood swings, or difficulty sleeping  HEME/LYMPH: No easy bruising, or bleeding gums  ALLERGY AND IMMUNOLOGIC: No hives or eczema	    [ ] All others negative	  [ ] Unable to obtain    PHYSICAL EXAM:  T(C): 36.7 (05-05-21 @ 04:40), Max: 36.7 (05-05-21 @ 04:40)  HR: 91 (05-05-21 @ 04:40) (86 - 97)  BP: 146/82 (05-05-21 @ 04:40) (119/68 - 150/72)  RR: 18 (05-05-21 @ 04:40) (18 - 20)  SpO2: 96% (05-05-21 @ 04:40) (94% - 98%)  Wt(kg): --  I&O's Summary    04 May 2021 07:01  -  05 May 2021 07:00  --------------------------------------------------------  IN: 1010 mL / OUT: 300 mL / NET: 710 mL        Appearance: Normal	  HEENT:   Normal oral mucosa, PERRL, EOMI	  Lymphatic: No lymphadenopathy  Cardiovascular: Normal S1 S2, No JVD, + murmurs, No edema  Respiratory: rhonchi  Psychiatry: A & O x 3, Mood & affect appropriate  Gastrointestinal:  Soft, Non-tender, + BS	  Skin: No rashes, No ecchymoses, No cyanosis	  Neurologic: Non-focal  Extremities: Normal range of motion, No clubbing, cyanosis or edema  Vascular: Peripheral pulses palpable 2+ bilaterally    MEDICATIONS  (STANDING):  aspirin enteric coated 81 milliGRAM(s) Oral daily  atorvastatin 20 milliGRAM(s) Oral at bedtime  DULoxetine 60 milliGRAM(s) Oral daily  enoxaparin Injectable 110 milliGRAM(s) SubCutaneous two times a day  levothyroxine 50 MICROGram(s) Oral daily  metoprolol tartrate 50 milliGRAM(s) Oral three times a day      TELEMETRY: 	    ECG:  	  RADIOLOGY:  OTHER: 	  	  LABS:	 	    CARDIAC MARKERS:  CARDIAC MARKERS ( 03 May 2021 19:53 )  x     / x     / 62 U/L / x     / 1.9 ng/mL                                11.5   9.38  )-----------( 285      ( 03 May 2021 13:03 )             36.4     05-03    138  |  100  |  17  ----------------------------<  117<H>  4.2   |  20<L>  |  0.73    Ca    9.0      03 May 2021 13:03  Mg     1.7     05-03    TPro  7.6  /  Alb  3.6  /  TBili  0.3  /  DBili  x   /  AST  18  /  ALT  13  /  AlkPhos  92  05-03    proBNP:   Lipid Profile: Cholesterol 120  LDL --  HDL 40  TG 63    HgA1c:   TSH: Thyroid Stimulating Hormone, Serum: 1.88 uIU/mL (05-04 @ 09:19)  Thyroid Stimulating Hormone, Serum: 1.59 uIU/mL (05-03 @ 20:45)    PT/INR - ( 03 May 2021 13:03 )   PT: 12.8 sec;   INR: 1.07 ratio         PTT - ( 03 May 2021 13:03 )  PTT:28.2 sec  < from: TTE with Doppler (w/Cont) (05.04.21 @ 09:15) >  1. Mitral annular calcification, otherwise normal mitral  valve. Minimal mitral regurgitation.  2. Calcified trileaflet aortic valve with normal opening.  No aortic valve regurgitation seen.  3. Endocardial visualization enhanced with intravenous  injection of Ultrasonic Enhancing Agent (Definity). Normal  left ventricular systolic function. No segmental wall  motion abnormalities.  4. The right ventricle is not well visualized; grossly  normal right ventricular systolic function.  5. Estimated pulmonary artery systolic pressure equals 42  mm Hg, assuming right atrial pressure equals 8 mm Hg,  consistent with mild pulmonary pressures.  < from: CT Angio Chest w/ IV Cont (05.04.21 @ 18:01) >  INTERPRETATION:  No pulmonary embolus in the main, left and right, and lobar pulmonary arteries. Limited evaluation of some of the segmental and most of the subsegmental pulmonary arteries secondary to suboptimal contrast bolus timing.    Follow-up official report in the AM.    < end of copied text >        Assessment and plan  ---------------------------  82 yo female PMH htn, hld, dm, anxiety, hypothyroidism presenting with feeling of anxiety and chest discomfort starting yesterday. Pt states she may have had spicy food and afterwards started to feel gas-like pressure in the center of her chest which would resolve with burping, however sensation would return. Today pt also felt as if she had a panic attack which she describes as a feeling as if she needs to go to sleep and lay down but unable to 2/2 racing thoughts. No cardiac workup in the past. Pt otherwise feels well no active CP currently. took on baby aspirin today. Pt states shes mostly concerned about her anxiety which she usually feels daily and has been worse over the past 6 months. Pt follows with psychiatrist and pcp regularly. no fevers, chills, nausea, vomiting, abdominal pain, diarrhea, constipation.   pt with sig cardiac risk factor with hx of htn increase cholesterol with palpitation and new onset of a.fib.  tele noted converted to NSR  beta blocker to control hr, continue 50 mg tid  tsh  lipid panel  cardiac enzymes are all negative  echo  will consider ischemia work up based on echo results  cta r/o PE negative  continue AC ?switch to apixaban  stress test

## 2021-05-06 LAB
ALBUMIN SERPL ELPH-MCNC: 3.4 G/DL — SIGNIFICANT CHANGE UP (ref 3.3–5)
ALP SERPL-CCNC: 84 U/L — SIGNIFICANT CHANGE UP (ref 40–120)
ALT FLD-CCNC: 13 U/L — SIGNIFICANT CHANGE UP (ref 10–45)
ANION GAP SERPL CALC-SCNC: 15 MMOL/L — SIGNIFICANT CHANGE UP (ref 5–17)
AST SERPL-CCNC: 17 U/L — SIGNIFICANT CHANGE UP (ref 10–40)
BASOPHILS # BLD AUTO: 0.02 K/UL — SIGNIFICANT CHANGE UP (ref 0–0.2)
BASOPHILS NFR BLD AUTO: 0.3 % — SIGNIFICANT CHANGE UP (ref 0–2)
BILIRUB SERPL-MCNC: 0.2 MG/DL — SIGNIFICANT CHANGE UP (ref 0.2–1.2)
BUN SERPL-MCNC: 18 MG/DL — SIGNIFICANT CHANGE UP (ref 7–23)
CALCIUM SERPL-MCNC: 8.8 MG/DL — SIGNIFICANT CHANGE UP (ref 8.4–10.5)
CHLORIDE SERPL-SCNC: 103 MMOL/L — SIGNIFICANT CHANGE UP (ref 96–108)
CO2 SERPL-SCNC: 21 MMOL/L — LOW (ref 22–31)
CREAT SERPL-MCNC: 0.88 MG/DL — SIGNIFICANT CHANGE UP (ref 0.5–1.3)
EOSINOPHIL # BLD AUTO: 0.09 K/UL — SIGNIFICANT CHANGE UP (ref 0–0.5)
EOSINOPHIL NFR BLD AUTO: 1.5 % — SIGNIFICANT CHANGE UP (ref 0–6)
GLUCOSE BLDC GLUCOMTR-MCNC: 111 MG/DL — HIGH (ref 70–99)
GLUCOSE BLDC GLUCOMTR-MCNC: 117 MG/DL — HIGH (ref 70–99)
GLUCOSE BLDC GLUCOMTR-MCNC: 154 MG/DL — HIGH (ref 70–99)
GLUCOSE SERPL-MCNC: 112 MG/DL — HIGH (ref 70–99)
HCT VFR BLD CALC: 32.5 % — LOW (ref 34.5–45)
HCT VFR BLD CALC: 35.4 % — SIGNIFICANT CHANGE UP (ref 34.5–45)
HGB BLD-MCNC: 10.3 G/DL — LOW (ref 11.5–15.5)
HGB BLD-MCNC: 11.2 G/DL — LOW (ref 11.5–15.5)
IMM GRANULOCYTES NFR BLD AUTO: 0.5 % — SIGNIFICANT CHANGE UP (ref 0–1.5)
LYMPHOCYTES # BLD AUTO: 1.81 K/UL — SIGNIFICANT CHANGE UP (ref 1–3.3)
LYMPHOCYTES # BLD AUTO: 30.7 % — SIGNIFICANT CHANGE UP (ref 13–44)
MCHC RBC-ENTMCNC: 28.9 PG — SIGNIFICANT CHANGE UP (ref 27–34)
MCHC RBC-ENTMCNC: 29.2 PG — SIGNIFICANT CHANGE UP (ref 27–34)
MCHC RBC-ENTMCNC: 31.6 GM/DL — LOW (ref 32–36)
MCHC RBC-ENTMCNC: 31.7 GM/DL — LOW (ref 32–36)
MCV RBC AUTO: 91 FL — SIGNIFICANT CHANGE UP (ref 80–100)
MCV RBC AUTO: 92.2 FL — SIGNIFICANT CHANGE UP (ref 80–100)
MONOCYTES # BLD AUTO: 0.77 K/UL — SIGNIFICANT CHANGE UP (ref 0–0.9)
MONOCYTES NFR BLD AUTO: 13.1 % — SIGNIFICANT CHANGE UP (ref 2–14)
NEUTROPHILS # BLD AUTO: 3.18 K/UL — SIGNIFICANT CHANGE UP (ref 1.8–7.4)
NEUTROPHILS NFR BLD AUTO: 53.9 % — SIGNIFICANT CHANGE UP (ref 43–77)
NRBC # BLD: 0 /100 WBCS — SIGNIFICANT CHANGE UP (ref 0–0)
NRBC # BLD: 0 /100 WBCS — SIGNIFICANT CHANGE UP (ref 0–0)
PLATELET # BLD AUTO: 239 K/UL — SIGNIFICANT CHANGE UP (ref 150–400)
PLATELET # BLD AUTO: 288 K/UL — SIGNIFICANT CHANGE UP (ref 150–400)
POTASSIUM SERPL-MCNC: 4.1 MMOL/L — SIGNIFICANT CHANGE UP (ref 3.5–5.3)
POTASSIUM SERPL-SCNC: 4.1 MMOL/L — SIGNIFICANT CHANGE UP (ref 3.5–5.3)
PROT SERPL-MCNC: 7 G/DL — SIGNIFICANT CHANGE UP (ref 6–8.3)
RBC # BLD: 3.57 M/UL — LOW (ref 3.8–5.2)
RBC # BLD: 3.84 M/UL — SIGNIFICANT CHANGE UP (ref 3.8–5.2)
RBC # FLD: 13.6 % — SIGNIFICANT CHANGE UP (ref 10.3–14.5)
RBC # FLD: 13.7 % — SIGNIFICANT CHANGE UP (ref 10.3–14.5)
SODIUM SERPL-SCNC: 139 MMOL/L — SIGNIFICANT CHANGE UP (ref 135–145)
WBC # BLD: 5.9 K/UL — SIGNIFICANT CHANGE UP (ref 3.8–10.5)
WBC # BLD: 8.74 K/UL — SIGNIFICANT CHANGE UP (ref 3.8–10.5)
WBC # FLD AUTO: 5.9 K/UL — SIGNIFICANT CHANGE UP (ref 3.8–10.5)
WBC # FLD AUTO: 8.74 K/UL — SIGNIFICANT CHANGE UP (ref 3.8–10.5)

## 2021-05-06 PROCEDURE — 78452 HT MUSCLE IMAGE SPECT MULT: CPT | Mod: 26

## 2021-05-06 PROCEDURE — 93016 CV STRESS TEST SUPVJ ONLY: CPT

## 2021-05-06 PROCEDURE — 93018 CV STRESS TEST I&R ONLY: CPT

## 2021-05-06 RX ORDER — APIXABAN 2.5 MG/1
5 TABLET, FILM COATED ORAL
Refills: 0 | Status: DISCONTINUED | OUTPATIENT
Start: 2021-05-06 | End: 2021-05-09

## 2021-05-06 RX ORDER — ALPRAZOLAM 0.25 MG
1 TABLET ORAL ONCE
Refills: 0 | Status: DISCONTINUED | OUTPATIENT
Start: 2021-05-06 | End: 2021-05-06

## 2021-05-06 RX ORDER — ACETAMINOPHEN 500 MG
650 TABLET ORAL ONCE
Refills: 0 | Status: COMPLETED | OUTPATIENT
Start: 2021-05-06 | End: 2021-05-06

## 2021-05-06 RX ADMIN — ATORVASTATIN CALCIUM 20 MILLIGRAM(S): 80 TABLET, FILM COATED ORAL at 21:25

## 2021-05-06 RX ADMIN — APIXABAN 5 MILLIGRAM(S): 2.5 TABLET, FILM COATED ORAL at 17:35

## 2021-05-06 RX ADMIN — POLYETHYLENE GLYCOL 3350 17 GRAM(S): 17 POWDER, FOR SOLUTION ORAL at 16:11

## 2021-05-06 RX ADMIN — Medication 1 MILLIGRAM(S): at 08:34

## 2021-05-06 RX ADMIN — Medication 50 MILLIGRAM(S): at 21:25

## 2021-05-06 RX ADMIN — DULOXETINE HYDROCHLORIDE 60 MILLIGRAM(S): 30 CAPSULE, DELAYED RELEASE ORAL at 16:10

## 2021-05-06 RX ADMIN — Medication 50 MILLIGRAM(S): at 06:49

## 2021-05-06 RX ADMIN — Medication 650 MILLIGRAM(S): at 21:43

## 2021-05-06 RX ADMIN — Medication 50 MICROGRAM(S): at 06:49

## 2021-05-06 RX ADMIN — Medication 81 MILLIGRAM(S): at 16:10

## 2021-05-06 RX ADMIN — Medication 50 MILLIGRAM(S): at 16:10

## 2021-05-06 RX ADMIN — ENOXAPARIN SODIUM 110 MILLIGRAM(S): 100 INJECTION SUBCUTANEOUS at 06:49

## 2021-05-06 NOTE — PROGRESS NOTE ADULT - SUBJECTIVE AND OBJECTIVE BOX
CARDIOLOGY     PROGRESS  NOTE   ________________________________________________    CHIEF COMPLAINT:Patient is a 81y old  Female who presents with a chief complaint of Anxiety with rapid heart rate. (05 May 2021 10:07)  remain in nsr.  	  REVIEW OF SYSTEMS:  CONSTITUTIONAL: No fever, weight loss, or fatigue  EYES: No eye pain, visual disturbances, or discharge  ENT:  No difficulty hearing, tinnitus, vertigo; No sinus or throat pain  NECK: No pain or stiffness  RESPIRATORY: No cough, wheezing, chills or hemoptysis; No Shortness of Breath  CARDIOVASCULAR: No chest pain, palpitations, passing out, dizziness, or leg swelling  GASTROINTESTINAL: No abdominal or epigastric pain. No nausea, vomiting, or hematemesis; No diarrhea or constipation. No melena or hematochezia.  GENITOURINARY: No dysuria, frequency, hematuria, or incontinence  NEUROLOGICAL: No headaches, memory loss, loss of strength, numbness, or tremors  SKIN: No itching, burning, rashes, or lesions   LYMPH Nodes: No enlarged glands  ENDOCRINE: No heat or cold intolerance; No hair loss  MUSCULOSKELETAL: No joint pain or swelling; No muscle, back, or extremity pain  PSYCHIATRIC: No depression, anxiety, mood swings, or difficulty sleeping  HEME/LYMPH: No easy bruising, or bleeding gums  ALLERGY AND IMMUNOLOGIC: No hives or eczema	    [ ] All others negative	  [ ] Unable to obtain    PHYSICAL EXAM:  T(C): 36.6 (05-06-21 @ 04:07), Max: 36.8 (05-05-21 @ 10:30)  HR: 81 (05-06-21 @ 04:07) (81 - 102)  BP: 134/73 (05-06-21 @ 04:07) (116/71 - 158/58)  RR: 18 (05-06-21 @ 04:07) (18 - 18)  SpO2: 94% (05-06-21 @ 04:07) (94% - 95%)  Wt(kg): --  I&O's Summary    05 May 2021 07:01  -  06 May 2021 07:00  --------------------------------------------------------  IN: 1080 mL / OUT: 0 mL / NET: 1080 mL        Appearance: Normal	  HEENT:   Normal oral mucosa, PERRL, EOMI	  Lymphatic: No lymphadenopathy  Cardiovascular: Normal S1 S2, No JVD, + murmurs, No edema  Respiratory: Lungs clear to auscultation	  Psychiatry: A & O x 3, Mood & affect appropriate  Gastrointestinal:  Soft, Non-tender, + BS	  Skin: No rashes, No ecchymoses, No cyanosis	  Neurologic: Non-focal  Extremities: Normal range of motion, No clubbing, cyanosis or edema  Vascular: Peripheral pulses palpable 2+ bilaterally    MEDICATIONS  (STANDING):  aspirin enteric coated 81 milliGRAM(s) Oral daily  atorvastatin 20 milliGRAM(s) Oral at bedtime  DULoxetine 60 milliGRAM(s) Oral daily  enoxaparin Injectable 110 milliGRAM(s) SubCutaneous two times a day  levothyroxine 50 MICROGram(s) Oral daily  metoprolol tartrate 50 milliGRAM(s) Oral three times a day      TELEMETRY: 	    ECG:  	  RADIOLOGY:  OTHER: 	  	  LABS:	 	    CARDIAC MARKERS:                                10.3   5.90  )-----------( 239      ( 06 May 2021 06:14 )             32.5     05-06    139  |  103  |  18  ----------------------------<  112<H>  4.1   |  21<L>  |  0.88    Ca    8.8      06 May 2021 06:13    TPro  7.0  /  Alb  3.4  /  TBili  0.2  /  DBili  x   /  AST  17  /  ALT  13  /  AlkPhos  84  05-06    proBNP:   Lipid Profile: Cholesterol 120  LDL --  HDL 40  TG 63    HgA1c:   TSH: Thyroid Stimulating Hormone, Serum: 1.88 uIU/mL (05-04 @ 09:19)  Thyroid Stimulating Hormone, Serum: 1.59 uIU/mL (05-03 @ 20:45)          Assessment and plan  ---------------------------  82 yo female PMH htn, hld, dm, anxiety, hypothyroidism presenting with feeling of anxiety and chest discomfort starting yesterday. Pt states she may have had spicy food and afterwards started to feel gas-like pressure in the center of her chest which would resolve with burping, however sensation would return. Today pt also felt as if she had a panic attack which she describes as a feeling as if she needs to go to sleep and lay down but unable to 2/2 racing thoughts. No cardiac workup in the past. Pt otherwise feels well no active CP currently. took on baby aspirin today. Pt states shes mostly concerned about her anxiety which she usually feels daily and has been worse over the past 6 months. Pt follows with psychiatrist and pcp regularly. no fevers, chills, nausea, vomiting, abdominal pain, diarrhea, constipation.   pt with sig cardiac risk factor with hx of htn increase cholesterol with palpitation and new onset of a.fib.  tele noted converted to NSR  beta blocker to control hr, continue 50 mg tid  tsh  lipid panel  cardiac enzymes are all negative  echo  will consider ischemia work up based on echo results  cta r/o PE negative  continue AC ?switch to apixaban  stress test

## 2021-05-06 NOTE — PROGRESS NOTE ADULT - ASSESSMENT
82 yo female   PMH htn, hld, dm, anxiety, hypothyroidism   presenting with feeling of anxiety and cp       felt as if she had a panic attack No cardiac workup in the past.     : New onset A.fib.,  now in nsr      tele. CHADVAC score 3.     on   Lovenox 110 mg bid, transition to NOAC later   tele, pt in NSR now  DM, follow  fs   on  Synthroid  50 mcg   CT  angio chest/, no PE/    echo , normal ef   awiating stress test/ very anxious, wants xanax  awiat stress test  defer ischemic w/p to card 80 yo female   PMH htn, hld, dm, anxiety, hypothyroidism   presenting with feeling of anxiety and cp       felt as if she had a panic attack No cardiac workup in the past.     : New onset A.fib.,  now in nsr      tele. CHADVAC score 3.     on   Lovenox 110 mg bid, transition to NOAC later   tele, pt in NSR now  DM, follow  fs   on  Synthroid  50 mcg   CT  angio chest/, no PE/    echo , normal ef   awiating stress test/ very anxious, wants xanax  awiat stress test  follow hb/ if still  anmeia, then gi eval bushra alaniz/ last  colonoscopy was  5 yrs ago

## 2021-05-06 NOTE — PROGRESS NOTE ADULT - SUBJECTIVE AND OBJECTIVE BOX
afebruile    REVIEW OF SYSTEMS:  GEN: no fever,    no chills  RESP: no SOB,   no cough  CVS: no chest pain,   no palpitations  GI: no abdominal pain,   no nausea,   no vomiting,   no constipation,   no diarrhea  : no dysuria,   no frequency  NEURO: no headache,   no dizziness  PSYCH: no depression,   not anxious  Derm : no rash    MEDICATIONS  (STANDING):  ALPRAZolam 1 milliGRAM(s) Oral once  apixaban 5 milliGRAM(s) Oral two times a day  aspirin enteric coated 81 milliGRAM(s) Oral daily  atorvastatin 20 milliGRAM(s) Oral at bedtime  DULoxetine 60 milliGRAM(s) Oral daily  levothyroxine 50 MICROGram(s) Oral daily  metoprolol tartrate 50 milliGRAM(s) Oral three times a day    MEDICATIONS  (PRN):  polyethylene glycol 3350 17 Gram(s) Oral daily PRN Constipation      Vital Signs Last 24 Hrs  T(C): 36.6 (06 May 2021 04:07), Max: 36.8 (05 May 2021 10:30)  T(F): 97.9 (06 May 2021 04:07), Max: 98.2 (05 May 2021 10:30)  HR: 81 (06 May 2021 04:07) (81 - 102)  BP: 134/73 (06 May 2021 04:07) (116/71 - 158/58)  BP(mean): --  RR: 18 (06 May 2021 04:07) (18 - 18)  SpO2: 94% (06 May 2021 04:07) (94% - 95%)  CAPILLARY BLOOD GLUCOSE      POCT Blood Glucose.: 111 mg/dL (06 May 2021 07:32)  POCT Blood Glucose.: 125 mg/dL (05 May 2021 21:08)  POCT Blood Glucose.: 104 mg/dL (05 May 2021 17:01)  POCT Blood Glucose.: 112 mg/dL (05 May 2021 11:26)    I&O's Summary    05 May 2021 07:01  -  06 May 2021 07:00  --------------------------------------------------------  IN: 1080 mL / OUT: 0 mL / NET: 1080 mL        PHYSICAL EXAM:  HEAD:  Atraumatic, Normocephalic  NECK: Supple, No   JVD  CHEST/LUNG:   no     rales,     no,    rhonchi  HEART: Regular rate and rhythm;         murmur  ABDOMEN: Soft, Nontender, ;   EXTREMITIES:    no    edema  NEUROLOGY:  alert    LABS:                        10.3   5.90  )-----------( 239      ( 06 May 2021 06:14 )             32.5     05-06    139  |  103  |  18  ----------------------------<  112<H>  4.1   |  21<L>  |  0.88    Ca    8.8      06 May 2021 06:13    TPro  7.0  /  Alb  3.4  /  TBili  0.2  /  DBili  x   /  AST  17  /  ALT  13  /  AlkPhos  84  05-06                    Thyroid Stimulating Hormone, Serum: 1.88 uIU/mL (05-04 @ 09:19)          Consultant(s) Notes Reviewed:      Care Discussed with Consultants/Other Providers:

## 2021-05-07 LAB
ALBUMIN SERPL ELPH-MCNC: 3.4 G/DL — SIGNIFICANT CHANGE UP (ref 3.3–5)
ALP SERPL-CCNC: 85 U/L — SIGNIFICANT CHANGE UP (ref 40–120)
ALT FLD-CCNC: 18 U/L — SIGNIFICANT CHANGE UP (ref 10–45)
ANION GAP SERPL CALC-SCNC: 14 MMOL/L — SIGNIFICANT CHANGE UP (ref 5–17)
AST SERPL-CCNC: 22 U/L — SIGNIFICANT CHANGE UP (ref 10–40)
BASOPHILS # BLD AUTO: 0.02 K/UL — SIGNIFICANT CHANGE UP (ref 0–0.2)
BASOPHILS NFR BLD AUTO: 0.3 % — SIGNIFICANT CHANGE UP (ref 0–2)
BILIRUB SERPL-MCNC: 0.2 MG/DL — SIGNIFICANT CHANGE UP (ref 0.2–1.2)
BUN SERPL-MCNC: 15 MG/DL — SIGNIFICANT CHANGE UP (ref 7–23)
CALCIUM SERPL-MCNC: 8.9 MG/DL — SIGNIFICANT CHANGE UP (ref 8.4–10.5)
CHLORIDE SERPL-SCNC: 104 MMOL/L — SIGNIFICANT CHANGE UP (ref 96–108)
CO2 SERPL-SCNC: 21 MMOL/L — LOW (ref 22–31)
CREAT SERPL-MCNC: 0.76 MG/DL — SIGNIFICANT CHANGE UP (ref 0.5–1.3)
EOSINOPHIL # BLD AUTO: 0.09 K/UL — SIGNIFICANT CHANGE UP (ref 0–0.5)
EOSINOPHIL NFR BLD AUTO: 1.4 % — SIGNIFICANT CHANGE UP (ref 0–6)
GLUCOSE BLDC GLUCOMTR-MCNC: 117 MG/DL — HIGH (ref 70–99)
GLUCOSE BLDC GLUCOMTR-MCNC: 119 MG/DL — HIGH (ref 70–99)
GLUCOSE SERPL-MCNC: 112 MG/DL — HIGH (ref 70–99)
HCT VFR BLD CALC: 33.5 % — LOW (ref 34.5–45)
HCT VFR BLD CALC: 33.6 % — LOW (ref 34.5–45)
HGB BLD-MCNC: 10.3 G/DL — LOW (ref 11.5–15.5)
HGB BLD-MCNC: 10.6 G/DL — LOW (ref 11.5–15.5)
IMM GRANULOCYTES NFR BLD AUTO: 0.5 % — SIGNIFICANT CHANGE UP (ref 0–1.5)
LYMPHOCYTES # BLD AUTO: 1.86 K/UL — SIGNIFICANT CHANGE UP (ref 1–3.3)
LYMPHOCYTES # BLD AUTO: 30 % — SIGNIFICANT CHANGE UP (ref 13–44)
MCHC RBC-ENTMCNC: 28.5 PG — SIGNIFICANT CHANGE UP (ref 27–34)
MCHC RBC-ENTMCNC: 29 PG — SIGNIFICANT CHANGE UP (ref 27–34)
MCHC RBC-ENTMCNC: 30.7 GM/DL — LOW (ref 32–36)
MCHC RBC-ENTMCNC: 31.5 GM/DL — LOW (ref 32–36)
MCV RBC AUTO: 91.8 FL — SIGNIFICANT CHANGE UP (ref 80–100)
MCV RBC AUTO: 92.5 FL — SIGNIFICANT CHANGE UP (ref 80–100)
MONOCYTES # BLD AUTO: 0.75 K/UL — SIGNIFICANT CHANGE UP (ref 0–0.9)
MONOCYTES NFR BLD AUTO: 12.1 % — SIGNIFICANT CHANGE UP (ref 2–14)
NEUTROPHILS # BLD AUTO: 3.46 K/UL — SIGNIFICANT CHANGE UP (ref 1.8–7.4)
NEUTROPHILS NFR BLD AUTO: 55.7 % — SIGNIFICANT CHANGE UP (ref 43–77)
NRBC # BLD: 0 /100 WBCS — SIGNIFICANT CHANGE UP (ref 0–0)
NRBC # BLD: 0 /100 WBCS — SIGNIFICANT CHANGE UP (ref 0–0)
PLATELET # BLD AUTO: 239 K/UL — SIGNIFICANT CHANGE UP (ref 150–400)
PLATELET # BLD AUTO: 256 K/UL — SIGNIFICANT CHANGE UP (ref 150–400)
POTASSIUM SERPL-MCNC: 4.2 MMOL/L — SIGNIFICANT CHANGE UP (ref 3.5–5.3)
POTASSIUM SERPL-SCNC: 4.2 MMOL/L — SIGNIFICANT CHANGE UP (ref 3.5–5.3)
PROT SERPL-MCNC: 6.8 G/DL — SIGNIFICANT CHANGE UP (ref 6–8.3)
RBC # BLD: 3.62 M/UL — LOW (ref 3.8–5.2)
RBC # BLD: 3.66 M/UL — LOW (ref 3.8–5.2)
RBC # FLD: 13.6 % — SIGNIFICANT CHANGE UP (ref 10.3–14.5)
RBC # FLD: 13.8 % — SIGNIFICANT CHANGE UP (ref 10.3–14.5)
SODIUM SERPL-SCNC: 139 MMOL/L — SIGNIFICANT CHANGE UP (ref 135–145)
WBC # BLD: 6.21 K/UL — SIGNIFICANT CHANGE UP (ref 3.8–10.5)
WBC # BLD: 6.9 K/UL — SIGNIFICANT CHANGE UP (ref 3.8–10.5)
WBC # FLD AUTO: 6.21 K/UL — SIGNIFICANT CHANGE UP (ref 3.8–10.5)
WBC # FLD AUTO: 6.9 K/UL — SIGNIFICANT CHANGE UP (ref 3.8–10.5)

## 2021-05-07 RX ORDER — SENNA PLUS 8.6 MG/1
2 TABLET ORAL AT BEDTIME
Refills: 0 | Status: DISCONTINUED | OUTPATIENT
Start: 2021-05-07 | End: 2021-05-12

## 2021-05-07 RX ORDER — METOPROLOL TARTRATE 50 MG
5 TABLET ORAL ONCE
Refills: 0 | Status: DISCONTINUED | OUTPATIENT
Start: 2021-05-07 | End: 2021-05-10

## 2021-05-07 RX ADMIN — DULOXETINE HYDROCHLORIDE 60 MILLIGRAM(S): 30 CAPSULE, DELAYED RELEASE ORAL at 12:33

## 2021-05-07 RX ADMIN — Medication 50 MICROGRAM(S): at 05:25

## 2021-05-07 RX ADMIN — Medication 50 MILLIGRAM(S): at 14:16

## 2021-05-07 RX ADMIN — Medication 81 MILLIGRAM(S): at 12:33

## 2021-05-07 RX ADMIN — ATORVASTATIN CALCIUM 20 MILLIGRAM(S): 80 TABLET, FILM COATED ORAL at 22:11

## 2021-05-07 RX ADMIN — POLYETHYLENE GLYCOL 3350 17 GRAM(S): 17 POWDER, FOR SOLUTION ORAL at 18:28

## 2021-05-07 RX ADMIN — APIXABAN 5 MILLIGRAM(S): 2.5 TABLET, FILM COATED ORAL at 05:25

## 2021-05-07 RX ADMIN — SENNA PLUS 2 TABLET(S): 8.6 TABLET ORAL at 22:12

## 2021-05-07 RX ADMIN — Medication 50 MILLIGRAM(S): at 22:11

## 2021-05-07 RX ADMIN — Medication 50 MILLIGRAM(S): at 05:25

## 2021-05-07 NOTE — PROGRESS NOTE ADULT - ASSESSMENT
80 yo female   PMH htn, hld, dm, anxiety, hypothyroidism   presenting with feeling of anxiety and cp       felt as if she had a panic attack No cardiac workup in the past.     : New onset A.fib.,  now in nsr      tele. CHADVAC score 3.     on   Lovenox 110 mg bid, transition to NOAC later   tele, pt in NSR now  DM, follow  fs   on  Synthroid  50 mcg   CT  angio chest/, no PE/    echo , normal ef   positive  stress  test/ [pe r card,  CT with  coronaries    anemia,  hb [facility], / last  colonoscopy was  5 yrs ago

## 2021-05-07 NOTE — PROGRESS NOTE ADULT - SUBJECTIVE AND OBJECTIVE BOX
no cp    REVIEW OF SYSTEMS:  GEN: no fever,    no chills  RESP: no SOB,   no cough  CVS: no chest pain,   no palpitations  GI: no abdominal pain,   no nausea,   no vomiting,   no constipation,   no diarrhea  : no dysuria,   no frequency  NEURO: no headache,   no dizziness  PSYCH: no depression,   not anxious  Derm : no rash    MEDICATIONS  (STANDING):  apixaban 5 milliGRAM(s) Oral two times a day  aspirin enteric coated 81 milliGRAM(s) Oral daily  atorvastatin 20 milliGRAM(s) Oral at bedtime  DULoxetine 60 milliGRAM(s) Oral daily  levothyroxine 50 MICROGram(s) Oral daily  metoprolol tartrate 50 milliGRAM(s) Oral three times a day    MEDICATIONS  (PRN):  polyethylene glycol 3350 17 Gram(s) Oral daily PRN Constipation      Vital Signs Last 24 Hrs  T(C): 36.7 (07 May 2021 04:02), Max: 37.1 (06 May 2021 21:22)  T(F): 98 (07 May 2021 04:02), Max: 98.7 (06 May 2021 21:22)  HR: 79 (07 May 2021 04:02) (79 - 98)  BP: 113/87 (07 May 2021 04:02) (113/87 - 161/86)  BP(mean): --  RR: 18 (07 May 2021 04:02) (16 - 18)  SpO2: 96% (07 May 2021 04:02) (96% - 97%)  CAPILLARY BLOOD GLUCOSE      POCT Blood Glucose.: 119 mg/dL (07 May 2021 07:43)  POCT Blood Glucose.: 117 mg/dL (06 May 2021 21:49)  POCT Blood Glucose.: 154 mg/dL (06 May 2021 16:53)    I&O's Summary    06 May 2021 07:01  -  07 May 2021 07:00  --------------------------------------------------------  IN: 240 mL / OUT: 0 mL / NET: 240 mL        PHYSICAL EXAM:  HEAD:  Atraumatic, Normocephalic  NECK: Supple, No   JVD  CHEST/LUNG:   no     rales,     no,    rhonchi  HEART: Regular rate and rhythm;         murmur  ABDOMEN: Soft, Nontender, ;   EXTREMITIES:  no     edema  NEUROLOGY:  alert    LABS:                        10.3   6.21  )-----------( 239      ( 07 May 2021 05:42 )             33.5     05-07    139  |  104  |  15  ----------------------------<  112<H>  4.2   |  21<L>  |  0.76    Ca    8.9      07 May 2021 05:42    TPro  6.8  /  Alb  3.4  /  TBili  0.2  /  DBili  x   /  AST  22  /  ALT  18  /  AlkPhos  85  05-07                    Thyroid Stimulating Hormone, Serum: 1.88 uIU/mL (05-04 @ 09:19)          Consultant(s) Notes Reviewed:      Care Discussed with Consultants/Other Providers:

## 2021-05-07 NOTE — PROGRESS NOTE ADULT - SUBJECTIVE AND OBJECTIVE BOX
CARDIOLOGY     PROGRESS  NOTE   ________________________________________________    CHIEF COMPLAINT:Patient is a 81y old  Female who presents with a chief complaint of Anxiety with rapid heart rate. (06 May 2021 08:19)  no complain.  	  REVIEW OF SYSTEMS:  CONSTITUTIONAL: No fever, weight loss, or fatigue  EYES: No eye pain, visual disturbances, or discharge  ENT:  No difficulty hearing, tinnitus, vertigo; No sinus or throat pain  NECK: No pain or stiffness  RESPIRATORY: No cough, wheezing, chills or hemoptysis; No Shortness of Breath  CARDIOVASCULAR: No chest pain, palpitations, passing out, dizziness, or leg swelling  GASTROINTESTINAL: No abdominal or epigastric pain. No nausea, vomiting, or hematemesis; No diarrhea or constipation. No melena or hematochezia.  GENITOURINARY: No dysuria, frequency, hematuria, or incontinence  NEUROLOGICAL: No headaches, memory loss, loss of strength, numbness, or tremors  SKIN: No itching, burning, rashes, or lesions   LYMPH Nodes: No enlarged glands  ENDOCRINE: No heat or cold intolerance; No hair loss  MUSCULOSKELETAL: No joint pain or swelling; No muscle, back, or extremity pain  PSYCHIATRIC: No depression, anxiety, mood swings, or difficulty sleeping  HEME/LYMPH: No easy bruising, or bleeding gums  ALLERGY AND IMMUNOLOGIC: No hives or eczema	    [ ] All others negative	  [ ] Unable to obtain    PHYSICAL EXAM:  T(C): 36.7 (05-07-21 @ 04:02), Max: 37.1 (05-06-21 @ 21:22)  HR: 79 (05-07-21 @ 04:02) (79 - 98)  BP: 113/87 (05-07-21 @ 04:02) (113/87 - 161/86)  RR: 18 (05-07-21 @ 04:02) (16 - 18)  SpO2: 96% (05-07-21 @ 04:02) (96% - 97%)  Wt(kg): --  I&O's Summary    06 May 2021 07:01  -  07 May 2021 07:00  --------------------------------------------------------  IN: 240 mL / OUT: 0 mL / NET: 240 mL        Appearance: Normal	  HEENT:   Normal oral mucosa, PERRL, EOMI	  Lymphatic: No lymphadenopathy  Cardiovascular: Normal S1 S2, No JVD, + murmurs, No edema  Respiratory: Lungs clear to auscultation	  Psychiatry: A & O x 3, Mood & affect appropriate  Gastrointestinal:  Soft, Non-tender, + BS	  Skin: No rashes, No ecchymoses, No cyanosis	  Neurologic: Non-focal  Extremities: Normal range of motion, No clubbing, cyanosis or edema  Vascular: Peripheral pulses palpable 2+ bilaterally    MEDICATIONS  (STANDING):  apixaban 5 milliGRAM(s) Oral two times a day  aspirin enteric coated 81 milliGRAM(s) Oral daily  atorvastatin 20 milliGRAM(s) Oral at bedtime  DULoxetine 60 milliGRAM(s) Oral daily  levothyroxine 50 MICROGram(s) Oral daily  metoprolol tartrate 50 milliGRAM(s) Oral three times a day      TELEMETRY: 	    ECG:  	  RADIOLOGY:  OTHER: 	  	  LABS:	 	    CARDIAC MARKERS:                                10.3   6.21  )-----------( 239      ( 07 May 2021 05:42 )             33.5     05-07    139  |  104  |  15  ----------------------------<  112<H>  4.2   |  21<L>  |  0.76    Ca    8.9      07 May 2021 05:42    TPro  6.8  /  Alb  3.4  /  TBili  0.2  /  DBili  x   /  AST  22  /  ALT  18  /  AlkPhos  85  05-07    proBNP:   Lipid Profile: Cholesterol 120  LDL --  HDL 40  TG 63    HgA1c:   TSH: Thyroid Stimulating Hormone, Serum: 1.88 uIU/mL (05-04 @ 09:19)  Thyroid Stimulating Hormone, Serum: 1.59 uIU/mL (05-03 @ 20:45)      < from: Nuclear Stress Test-Pharmacologic (Nuclear Stress Test-Pharmacologic .) (05.06.21 @ 13:41) >  * Symptom: Dyspnea.  * HR Response: Appropriate.  * BP Response: Appropriate.  * Heart Rhythm: Sinus Rhythm - Frequent APD's, Rare VPD's  - 91 BPM.  * Baseline ECG: Nonspecific ST-T wave abnormality.  * ECG Changes: No significant ischemic ST segment changes  beyond baseline abnormalities.  * Arrhythmia: Frequent APDs occurred during rest, stress  and recovery. Rare VPDs occurred during rest and stress.  * Review of raw data shows: Breast attenuation artifact.,  Extensive splanchnic uptake  * The left ventricle was normal in size.  * There are medium-sized, mild to moderate defects in the  distal anterior, distal anteroseptal, and apical walls  that are mostly fixed suggestive of infarct with mild  monet-infarct ischemia.  * There is a small, moderate defect in the basal septal  wall that is mostly fixed suggestive of infarct with mild  monet-infarct ischemia.  * Post-stress gated wall motion analysis was performed  (LVEF = 68 %;LVEDV = 72 ml.) revealing hypokinesis of the  basal septal, distal anterior, and apical walls.        Assessment and plan  ---------------------------  80 yo female PMH htn, hld, dm, anxiety, hypothyroidism presenting with feeling of anxiety and chest discomfort starting yesterday. Pt states she may have had spicy food and afterwards started to feel gas-like pressure in the center of her chest which would resolve with burping, however sensation would return. Today pt also felt as if she had a panic attack which she describes as a feeling as if she needs to go to sleep and lay down but unable to 2/2 racing thoughts. No cardiac workup in the past. Pt otherwise feels well no active CP currently. took on baby aspirin today. Pt states shes mostly concerned about her anxiety which she usually feels daily and has been worse over the past 6 months. Pt follows with psychiatrist and pcp regularly. no fevers, chills, nausea, vomiting, abdominal pain, diarrhea, constipation.   pt with sig cardiac risk factor with hx of htn increase cholesterol with palpitation and new onset of a.fib.  tele noted converted to NSR  beta blocker to control hr, continue 50 mg tid  tsh  lipid panel  cardiac enzymes are all negative  echo  will consider ischemia work up based on echo results  cta r/o PE negative  continue AC ?switch to apixaban  stress test, + will need cath or will consider cardiac ct

## 2021-05-08 LAB
ALBUMIN SERPL ELPH-MCNC: 3.5 G/DL — SIGNIFICANT CHANGE UP (ref 3.3–5)
ALP SERPL-CCNC: 89 U/L — SIGNIFICANT CHANGE UP (ref 40–120)
ALT FLD-CCNC: 19 U/L — SIGNIFICANT CHANGE UP (ref 10–45)
ANION GAP SERPL CALC-SCNC: 14 MMOL/L — SIGNIFICANT CHANGE UP (ref 5–17)
AST SERPL-CCNC: 20 U/L — SIGNIFICANT CHANGE UP (ref 10–40)
BASOPHILS # BLD AUTO: 0.02 K/UL — SIGNIFICANT CHANGE UP (ref 0–0.2)
BASOPHILS NFR BLD AUTO: 0.3 % — SIGNIFICANT CHANGE UP (ref 0–2)
BILIRUB SERPL-MCNC: 0.2 MG/DL — SIGNIFICANT CHANGE UP (ref 0.2–1.2)
BUN SERPL-MCNC: 16 MG/DL — SIGNIFICANT CHANGE UP (ref 7–23)
CALCIUM SERPL-MCNC: 9 MG/DL — SIGNIFICANT CHANGE UP (ref 8.4–10.5)
CHLORIDE SERPL-SCNC: 103 MMOL/L — SIGNIFICANT CHANGE UP (ref 96–108)
CO2 SERPL-SCNC: 22 MMOL/L — SIGNIFICANT CHANGE UP (ref 22–31)
CREAT SERPL-MCNC: 0.75 MG/DL — SIGNIFICANT CHANGE UP (ref 0.5–1.3)
EOSINOPHIL # BLD AUTO: 0.07 K/UL — SIGNIFICANT CHANGE UP (ref 0–0.5)
EOSINOPHIL NFR BLD AUTO: 1 % — SIGNIFICANT CHANGE UP (ref 0–6)
GLUCOSE BLDC GLUCOMTR-MCNC: 103 MG/DL — HIGH (ref 70–99)
GLUCOSE BLDC GLUCOMTR-MCNC: 109 MG/DL — HIGH (ref 70–99)
GLUCOSE BLDC GLUCOMTR-MCNC: 119 MG/DL — HIGH (ref 70–99)
GLUCOSE BLDC GLUCOMTR-MCNC: 124 MG/DL — HIGH (ref 70–99)
GLUCOSE SERPL-MCNC: 115 MG/DL — HIGH (ref 70–99)
HCT VFR BLD CALC: 32.4 % — LOW (ref 34.5–45)
HGB BLD-MCNC: 10.2 G/DL — LOW (ref 11.5–15.5)
IMM GRANULOCYTES NFR BLD AUTO: 0.4 % — SIGNIFICANT CHANGE UP (ref 0–1.5)
LYMPHOCYTES # BLD AUTO: 1.72 K/UL — SIGNIFICANT CHANGE UP (ref 1–3.3)
LYMPHOCYTES # BLD AUTO: 23.5 % — SIGNIFICANT CHANGE UP (ref 13–44)
MCHC RBC-ENTMCNC: 28.8 PG — SIGNIFICANT CHANGE UP (ref 27–34)
MCHC RBC-ENTMCNC: 31.5 GM/DL — LOW (ref 32–36)
MCV RBC AUTO: 91.5 FL — SIGNIFICANT CHANGE UP (ref 80–100)
MONOCYTES # BLD AUTO: 1.13 K/UL — HIGH (ref 0–0.9)
MONOCYTES NFR BLD AUTO: 15.4 % — HIGH (ref 2–14)
NEUTROPHILS # BLD AUTO: 4.36 K/UL — SIGNIFICANT CHANGE UP (ref 1.8–7.4)
NEUTROPHILS NFR BLD AUTO: 59.4 % — SIGNIFICANT CHANGE UP (ref 43–77)
NRBC # BLD: 0 /100 WBCS — SIGNIFICANT CHANGE UP (ref 0–0)
PLATELET # BLD AUTO: 278 K/UL — SIGNIFICANT CHANGE UP (ref 150–400)
POTASSIUM SERPL-MCNC: 4.1 MMOL/L — SIGNIFICANT CHANGE UP (ref 3.5–5.3)
POTASSIUM SERPL-SCNC: 4.1 MMOL/L — SIGNIFICANT CHANGE UP (ref 3.5–5.3)
PROT SERPL-MCNC: 7.1 G/DL — SIGNIFICANT CHANGE UP (ref 6–8.3)
RBC # BLD: 3.54 M/UL — LOW (ref 3.8–5.2)
RBC # FLD: 13.8 % — SIGNIFICANT CHANGE UP (ref 10.3–14.5)
SODIUM SERPL-SCNC: 139 MMOL/L — SIGNIFICANT CHANGE UP (ref 135–145)
WBC # BLD: 7.33 K/UL — SIGNIFICANT CHANGE UP (ref 3.8–10.5)
WBC # FLD AUTO: 7.33 K/UL — SIGNIFICANT CHANGE UP (ref 3.8–10.5)

## 2021-05-08 RX ORDER — METOPROLOL TARTRATE 50 MG
100 TABLET ORAL
Refills: 0 | Status: DISCONTINUED | OUTPATIENT
Start: 2021-05-08 | End: 2021-05-12

## 2021-05-08 RX ADMIN — DULOXETINE HYDROCHLORIDE 60 MILLIGRAM(S): 30 CAPSULE, DELAYED RELEASE ORAL at 11:35

## 2021-05-08 RX ADMIN — APIXABAN 5 MILLIGRAM(S): 2.5 TABLET, FILM COATED ORAL at 05:31

## 2021-05-08 RX ADMIN — Medication 50 MICROGRAM(S): at 05:31

## 2021-05-08 RX ADMIN — Medication 100 MILLIGRAM(S): at 17:08

## 2021-05-08 RX ADMIN — Medication 50 MILLIGRAM(S): at 05:31

## 2021-05-08 RX ADMIN — ATORVASTATIN CALCIUM 20 MILLIGRAM(S): 80 TABLET, FILM COATED ORAL at 21:47

## 2021-05-08 RX ADMIN — Medication 81 MILLIGRAM(S): at 11:36

## 2021-05-08 NOTE — PROVIDER CONTACT NOTE (OTHER) - ASSESSMENT
pt ao4, resting in bed. pt states pain on L arm bruise when touched/palpated. pt states it has been getting better over the last day. pt ao4, resting in bed. pt states pain on L arm bruise when touched/palpated. pt states it has been getting bigger over the last day.

## 2021-05-08 NOTE — PROGRESS NOTE ADULT - SUBJECTIVE AND OBJECTIVE BOX
afberile  REVIEW OF SYSTEMS:  GEN: no fever,    no chills  RESP: no SOB,   no cough  CVS: no chest pain,   no palpitations  GI: no abdominal pain,   no nausea,   no vomiting,   no constipation,   no diarrhea  : no dysuria,   no frequency  NEURO: no headache,   no dizziness  PSYCH: no depression,   not anxious  Derm : no rash    MEDICATIONS  (STANDING):  apixaban 5 milliGRAM(s) Oral two times a day  aspirin enteric coated 81 milliGRAM(s) Oral daily  atorvastatin 20 milliGRAM(s) Oral at bedtime  DULoxetine 60 milliGRAM(s) Oral daily  levothyroxine 50 MICROGram(s) Oral daily  metoprolol tartrate 50 milliGRAM(s) Oral three times a day  metoprolol tartrate Injectable 5 milliGRAM(s) IV Push once  senna 2 Tablet(s) Oral at bedtime    MEDICATIONS  (PRN):  polyethylene glycol 3350 17 Gram(s) Oral daily PRN Constipation      Vital Signs Last 24 Hrs  T(C): 36.7 (08 May 2021 11:18), Max: 36.7 (08 May 2021 11:18)  T(F): 98 (08 May 2021 11:18), Max: 98 (08 May 2021 11:18)  HR: 91 (08 May 2021 11:18) (69 - 91)  BP: 146/86 (08 May 2021 11:18) (125/66 - 146/86)  BP(mean): --  RR: 18 (08 May 2021 11:18) (18 - 18)  SpO2: 94% (08 May 2021 11:18) (94% - 96%)  CAPILLARY BLOOD GLUCOSE      POCT Blood Glucose.: 103 mg/dL (08 May 2021 11:43)  POCT Blood Glucose.: 119 mg/dL (08 May 2021 07:17)    I&O's Summary    07 May 2021 07:01  -  08 May 2021 07:00  --------------------------------------------------------  IN: 1140 mL / OUT: 0 mL / NET: 1140 mL        PHYSICAL EXAM:  HEAD:  Atraumatic, Normocephalic  NECK: Supple, No   JVD  CHEST/LUNG:   no     rales,     no,    rhonchi  HEART: Regular rate and rhythm;         murmur  ABDOMEN: Soft, Nontender, ;   EXTREMITIES:  no      edema  NEUROLOGY:  alert    LABS:                        10.2   7.33  )-----------( 278      ( 08 May 2021 11:38 )             32.4     05-08    139  |  103  |  16  ----------------------------<  115<H>  4.1   |  22  |  0.75    Ca    9.0      08 May 2021 11:38    TPro  7.1  /  Alb  3.5  /  TBili  0.2  /  DBili  x   /  AST  20  /  ALT  19  /  AlkPhos  89  05-08                    Thyroid Stimulating Hormone, Serum: 1.88 uIU/mL (05-04 @ 09:19)          Consultant(s) Notes Reviewed:      Care Discussed with Consultants/Other Providers:

## 2021-05-08 NOTE — PROGRESS NOTE ADULT - ASSESSMENT
80 yo female   PMH htn, hld, dm, anxiety, hypothyroidism   presenting with feeling of anxiety and cp       felt as if she had a panic attack No cardiac workup in the past.     : New onset A.fib.,  now in nsr      tele. CHADVAC score 3.     on   Lovenox 110 mg bid, transition to NOAC later   tele, pt in NSR now  DM, follow  fs   on  Synthroid  50 mcg   CT  angio chest/, no PE/    echo , normal ef   positive  stress  test/ [pe r card,  CT with  coronaries/  vx  cath,  defer to  card    anemia,  hb stbale, / last  colonoscopy was  5 yrs ago

## 2021-05-08 NOTE — PROGRESS NOTE ADULT - SUBJECTIVE AND OBJECTIVE BOX
CARDIOLOGY     PROGRESS  NOTE   ________________________________________________    CHIEF COMPLAINT:Patient is a 81y old  Female who presents with a chief complaint of Anxiety with rapid heart rate. (08 May 2021 12:13)  no complain.  	  REVIEW OF SYSTEMS:  CONSTITUTIONAL: No fever, weight loss, or fatigue  EYES: No eye pain, visual disturbances, or discharge  ENT:  No difficulty hearing, tinnitus, vertigo; No sinus or throat pain  NECK: No pain or stiffness  RESPIRATORY: No cough, wheezing, chills or hemoptysis; no  Shortness of Breath  CARDIOVASCULAR: No chest pain, palpitations, passing out, dizziness, or leg swelling  GASTROINTESTINAL: No abdominal or epigastric pain. No nausea, vomiting, or hematemesis; No diarrhea or constipation. No melena or hematochezia.  GENITOURINARY: No dysuria, frequency, hematuria, or incontinence  NEUROLOGICAL: No headaches, memory loss, loss of strength, numbness, or tremors  SKIN: No itching, burning, rashes, or lesions   LYMPH Nodes: No enlarged glands  ENDOCRINE: No heat or cold intolerance; No hair loss  MUSCULOSKELETAL: No joint pain or swelling; No muscle, back, or extremity pain  PSYCHIATRIC: No depression, anxiety, mood swings, or difficulty sleeping  HEME/LYMPH: No easy bruising, or bleeding gums  ALLERGY AND IMMUNOLOGIC: No hives or eczema	    [ ] All others negative	  [ ] Unable to obtain    PHYSICAL EXAM:  T(C): 36.7 (05-08-21 @ 11:18), Max: 36.7 (05-08-21 @ 11:18)  HR: 91 (05-08-21 @ 11:18) (69 - 91)  BP: 146/86 (05-08-21 @ 11:18) (125/66 - 146/86)  RR: 18 (05-08-21 @ 11:18) (18 - 18)  SpO2: 94% (05-08-21 @ 11:18) (94% - 96%)  Wt(kg): --  I&O's Summary    07 May 2021 07:01  -  08 May 2021 07:00  --------------------------------------------------------  IN: 1140 mL / OUT: 0 mL / NET: 1140 mL        Appearance: Normal	  HEENT:   Normal oral mucosa, PERRL, EOMI	  Lymphatic: No lymphadenopathy  Cardiovascular: Normal S1 S2, No JVD, + murmurs, No edema  Respiratory: Lungs clear to auscultation	  Psychiatry: A & O x 3, Mood & affect appropriate  Gastrointestinal:  Soft, Non-tender, + BS	  Skin: No rashes, No ecchymoses, No cyanosis	  Neurologic: Non-focal  Extremities: Normal range of motion, No clubbing, cyanosis or edema  Vascular: Peripheral pulses palpable 2+ bilaterally    MEDICATIONS  (STANDING):  apixaban 5 milliGRAM(s) Oral two times a day  aspirin enteric coated 81 milliGRAM(s) Oral daily  atorvastatin 20 milliGRAM(s) Oral at bedtime  DULoxetine 60 milliGRAM(s) Oral daily  levothyroxine 50 MICROGram(s) Oral daily  metoprolol tartrate 50 milliGRAM(s) Oral three times a day  metoprolol tartrate Injectable 5 milliGRAM(s) IV Push once  senna 2 Tablet(s) Oral at bedtime      TELEMETRY: 	    ECG:  	  RADIOLOGY:  OTHER: 	  	  LABS:	 	    CARDIAC MARKERS:                                10.2   7.33  )-----------( 278      ( 08 May 2021 11:38 )             32.4     05-08    139  |  103  |  16  ----------------------------<  115<H>  4.1   |  22  |  0.75    Ca    9.0      08 May 2021 11:38    TPro  7.1  /  Alb  3.5  /  TBili  0.2  /  DBili  x   /  AST  20  /  ALT  19  /  AlkPhos  89  05-08    proBNP:   Lipid Profile: Cholesterol 120  LDL --  HDL 40  TG 63    HgA1c:   TSH: Thyroid Stimulating Hormone, Serum: 1.88 uIU/mL (05-04 @ 09:19)  Thyroid Stimulating Hormone, Serum: 1.59 uIU/mL (05-03 @ 20:45)          Assessment and plan  ---------------------------  82 yo female PMH htn, hld, dm, anxiety, hypothyroidism presenting with feeling of anxiety and chest discomfort starting yesterday. Pt states she may have had spicy food and afterwards started to feel gas-like pressure in the center of her chest which would resolve with burping, however sensation would return. Today pt also felt as if she had a panic attack which she describes as a feeling as if she needs to go to sleep and lay down but unable to 2/2 racing thoughts. No cardiac workup in the past. Pt otherwise feels well no active CP currently. took on baby aspirin today. Pt states shes mostly concerned about her anxiety which she usually feels daily and has been worse over the past 6 months. Pt follows with psychiatrist and pcp regularly. no fevers, chills, nausea, vomiting, abdominal pain, diarrhea, constipation.   pt with sig cardiac risk factor with hx of htn increase cholesterol with palpitation and new onset of a.fib.  tele noted converted to NSR  beta blocker to control hr, continue 50 mg tid  tsh  lipid panel  cardiac enzymes are all negative  echo  will consider ischemia work up based on echo results  cta r/o PE negative  continue AC ?switch to apixaban  stress test, + will need cath or will consider cardiac ct  increase metoprolol to 100 mg bid

## 2021-05-09 LAB
GLUCOSE BLDC GLUCOMTR-MCNC: 113 MG/DL — HIGH (ref 70–99)
GLUCOSE BLDC GLUCOMTR-MCNC: 128 MG/DL — HIGH (ref 70–99)
GLUCOSE BLDC GLUCOMTR-MCNC: 135 MG/DL — HIGH (ref 70–99)
GLUCOSE BLDC GLUCOMTR-MCNC: 144 MG/DL — HIGH (ref 70–99)

## 2021-05-09 RX ADMIN — Medication 100 MILLIGRAM(S): at 05:17

## 2021-05-09 RX ADMIN — SENNA PLUS 2 TABLET(S): 8.6 TABLET ORAL at 21:21

## 2021-05-09 RX ADMIN — Medication 81 MILLIGRAM(S): at 11:11

## 2021-05-09 RX ADMIN — DULOXETINE HYDROCHLORIDE 60 MILLIGRAM(S): 30 CAPSULE, DELAYED RELEASE ORAL at 11:11

## 2021-05-09 RX ADMIN — Medication 100 MILLIGRAM(S): at 18:02

## 2021-05-09 RX ADMIN — ATORVASTATIN CALCIUM 20 MILLIGRAM(S): 80 TABLET, FILM COATED ORAL at 21:21

## 2021-05-09 RX ADMIN — Medication 50 MICROGRAM(S): at 05:17

## 2021-05-09 NOTE — PROGRESS NOTE ADULT - SUBJECTIVE AND OBJECTIVE BOX
CARDIOLOGY     PROGRESS  NOTE   ________________________________________________    CHIEF COMPLAINT:Patient is a 81y old  Female who presents with a chief complaint of Anxiety with rapid heart rate. (09 May 2021 07:36)  no complain.  	  REVIEW OF SYSTEMS:  CONSTITUTIONAL: No fever, weight loss, or fatigue  EYES: No eye pain, visual disturbances, or discharge  ENT:  No difficulty hearing, tinnitus, vertigo; No sinus or throat pain  NECK: No pain or stiffness  RESPIRATORY: No cough, wheezing, chills or hemoptysis; No Shortness of Breath  CARDIOVASCULAR: No chest pain, palpitations, passing out, dizziness, or leg swelling  GASTROINTESTINAL: No abdominal or epigastric pain. No nausea, vomiting, or hematemesis; No diarrhea or constipation. No melena or hematochezia.  GENITOURINARY: No dysuria, frequency, hematuria, or incontinence  NEUROLOGICAL: No headaches, memory loss, loss of strength, numbness, or tremors  SKIN: No itching, burning, rashes, or lesions   LYMPH Nodes: No enlarged glands  ENDOCRINE: No heat or cold intolerance; No hair loss  MUSCULOSKELETAL: No joint pain or swelling; No muscle, back, or extremity pain  PSYCHIATRIC: No depression, anxiety, mood swings, or difficulty sleeping  HEME/LYMPH: No easy bruising, or bleeding gums  ALLERGY AND IMMUNOLOGIC: No hives or eczema	    [ ] All others negative	  [ ] Unable to obtain    PHYSICAL EXAM:  T(C): 36.8 (05-09-21 @ 05:15), Max: 36.8 (05-09-21 @ 05:15)  HR: 90 (05-09-21 @ 05:15) (66 - 91)  BP: 149/74 (05-09-21 @ 05:15) (125/63 - 149/74)  RR: 18 (05-09-21 @ 05:15) (18 - 18)  SpO2: 95% (05-09-21 @ 05:15) (94% - 96%)  Wt(kg): --  I&O's Summary    08 May 2021 07:01  -  09 May 2021 07:00  --------------------------------------------------------  IN: 960 mL / OUT: 0 mL / NET: 960 mL        Appearance: Normal	  HEENT:   Normal oral mucosa, PERRL, EOMI	  Lymphatic: No lymphadenopathy  Cardiovascular: Normal S1 S2, No JVD, + murmurs, No edema  Respiratory: Lungs clear to auscultation	  Psychiatry: A & O x 3, Mood & affect appropriate  Gastrointestinal:  Soft, Non-tender, + BS	  Skin: No rashes, No ecchymoses, No cyanosis	  Neurologic: Non-focal  Extremities: Normal range of motion, No clubbing, cyanosis or edema  Vascular: Peripheral pulses palpable 2+ bilaterally    MEDICATIONS  (STANDING):  apixaban 5 milliGRAM(s) Oral two times a day  aspirin enteric coated 81 milliGRAM(s) Oral daily  atorvastatin 20 milliGRAM(s) Oral at bedtime  DULoxetine 60 milliGRAM(s) Oral daily  levothyroxine 50 MICROGram(s) Oral daily  metoprolol tartrate 100 milliGRAM(s) Oral two times a day  metoprolol tartrate Injectable 5 milliGRAM(s) IV Push once  senna 2 Tablet(s) Oral at bedtime      TELEMETRY: 	    ECG:  	  RADIOLOGY:  OTHER: 	  	  LABS:	 	    CARDIAC MARKERS:                                10.2   7.33  )-----------( 278      ( 08 May 2021 11:38 )             32.4     05-08    139  |  103  |  16  ----------------------------<  115<H>  4.1   |  22  |  0.75    Ca    9.0      08 May 2021 11:38    TPro  7.1  /  Alb  3.5  /  TBili  0.2  /  DBili  x   /  AST  20  /  ALT  19  /  AlkPhos  89  05-08    proBNP:   Lipid Profile: Cholesterol 120  LDL --  HDL 40  TG 63    HgA1c:   TSH: Thyroid Stimulating Hormone, Serum: 1.88 uIU/mL (05-04 @ 09:19)  Thyroid Stimulating Hormone, Serum: 1.59 uIU/mL (05-03 @ 20:45)    < from: Nuclear Stress Test-Pharmacologic (Nuclear Stress Test-Pharmacologic .) (05.06.21 @ 13:41) >  * Symptom: Dyspnea.  * HR Response: Appropriate.  * BP Response: Appropriate.  * Heart Rhythm: Sinus Rhythm - Frequent APD's, Rare VPD's  - 91 BPM.  * Baseline ECG: Nonspecific ST-T wave abnormality.  * ECG Changes: No significant ischemic ST segment changes  beyond baseline abnormalities.  * Arrhythmia: Frequent APDs occurred during rest, stress  and recovery. Rare VPDs occurred during rest and stress.  * Review of raw data shows: Breast attenuation artifact.,  Extensive splanchnic uptake  * The left ventricle was normal in size.  * There are medium-sized, mild to moderate defects in the  distal anterior, distal anteroseptal, and apical walls  that are mostly fixed suggestive of infarct with mild  monet-infarct ischemia.  * There is a small, moderate defect in the basal septal  wall that is mostly fixed suggestive of infarct with mild  monet-infarct ischemia.  * Post-stress gated wall motion analysis was performed  (LVEF = 68 %;LVEDV = 72 ml.) revealing hypokinesis of the  basal septal, distal anterior, and apical walls.    < end of copied text >        Assessment and plan  ---------------------------    80 yo female PMH htn, hld, dm, anxiety, hypothyroidism presenting with feeling of anxiety and chest discomfort starting yesterday. Pt states she may have had spicy food and afterwards started to feel gas-like pressure in the center of her chest which would resolve with burping, however sensation would return. Today pt also felt as if she had a panic attack which she describes as a feeling as if she needs to go to sleep and lay down but unable to 2/2 racing thoughts. No cardiac workup in the past. Pt otherwise feels well no active CP currently. took on baby aspirin today. Pt states shes mostly concerned about her anxiety which she usually feels daily and has been worse over the past 6 months. Pt follows with psychiatrist and pcp regularly. no fevers, chills, nausea, vomiting, abdominal pain, diarrhea, constipation.   pt with sig cardiac risk factor with hx of htn increase cholesterol with palpitation and new onset of a.fib.  tele noted converted to NSR  beta blocker to control hr, continue 50 mg tid  tsh  lipid panel  cardiac enzymes are all negative  echo  will consider ischemia work up based on echo results  cta r/o PE negative  continue AC ?switch to apixaban  stress test, + will need cath or will consider cardiac ct, discussed with pt does not want cath ?false positive stress test  increase metoprolol to 100 mg bid

## 2021-05-09 NOTE — PROGRESS NOTE ADULT - ASSESSMENT
80 yo female   PMH htn, hld, dm, anxiety, hypothyroidism   presenting with feeling of anxiety and cp       felt as if she had a panic attack No cardiac workup in the past.     : New onset A.fib.,  now in nsr      tele. CHADVAC score 3.     on   Lovenox 110 mg bid, transition to NOAC later   tele, pt in NSR now  DM, follow  fs,  on   Synthroid  50 mcg   CT  angio chest/, no PE/    echo , normal ef   positive  stress  test/ [pe r card,  CT with  coronaries/  vx  cath,  defer to  card    anemia,  hb stbale, / last  colonoscopy was  5 yrs ago  pt refusing  cath/  has  no  peripheral  access,  therefore  awiating  mid  line.  eliquis  on  hold

## 2021-05-09 NOTE — PROGRESS NOTE ADULT - SUBJECTIVE AND OBJECTIVE BOX
afebrile  REVIEW OF SYSTEMS:  GEN: no fever,    no chills  RESP: no SOB,   no cough  CVS: no chest pain,   no palpitations  GI: no abdominal pain,   no nausea,   no vomiting,   no constipation,   no diarrhea  : no dysuria,   no frequency  NEURO: no headache,   no dizziness  PSYCH: no depression,   not anxious  Derm : no rash    MEDICATIONS  (STANDING):  apixaban 5 milliGRAM(s) Oral two times a day  aspirin enteric coated 81 milliGRAM(s) Oral daily  atorvastatin 20 milliGRAM(s) Oral at bedtime  DULoxetine 60 milliGRAM(s) Oral daily  levothyroxine 50 MICROGram(s) Oral daily  metoprolol tartrate 100 milliGRAM(s) Oral two times a day  metoprolol tartrate Injectable 5 milliGRAM(s) IV Push once  senna 2 Tablet(s) Oral at bedtime    MEDICATIONS  (PRN):  polyethylene glycol 3350 17 Gram(s) Oral daily PRN Constipation      Vital Signs Last 24 Hrs  T(C): 36.8 (09 May 2021 05:15), Max: 36.8 (09 May 2021 05:15)  T(F): 98.2 (09 May 2021 05:15), Max: 98.2 (09 May 2021 05:15)  HR: 90 (09 May 2021 05:15) (66 - 91)  BP: 149/74 (09 May 2021 05:15) (125/63 - 149/74)  BP(mean): --  RR: 18 (09 May 2021 05:15) (18 - 18)  SpO2: 95% (09 May 2021 05:15) (94% - 96%)  CAPILLARY BLOOD GLUCOSE      POCT Blood Glucose.: 113 mg/dL (09 May 2021 07:35)  POCT Blood Glucose.: 124 mg/dL (08 May 2021 20:30)  POCT Blood Glucose.: 109 mg/dL (08 May 2021 16:26)  POCT Blood Glucose.: 103 mg/dL (08 May 2021 11:43)    I&O's Summary    08 May 2021 07:01  -  09 May 2021 07:00  --------------------------------------------------------  IN: 960 mL / OUT: 0 mL / NET: 960 mL        PHYSICAL EXAM:  HEAD:  Atraumatic, Normocephalic  NECK: Supple, No   JVD  CHEST/LUNG:   no     rales,     no,    rhonchi  HEART: Regular rate and rhythm;         murmur  ABDOMEN: Soft, Nontender, ;   EXTREMITIES:   no     edema  NEUROLOGY:  alert    LABS:                        10.2   7.33  )-----------( 278      ( 08 May 2021 11:38 )             32.4     05-08    139  |  103  |  16  ----------------------------<  115<H>  4.1   |  22  |  0.75    Ca    9.0      08 May 2021 11:38    TPro  7.1  /  Alb  3.5  /  TBili  0.2  /  DBili  x   /  AST  20  /  ALT  19  /  AlkPhos  89  05-08                    Thyroid Stimulating Hormone, Serum: 1.88 uIU/mL (05-04 @ 09:19)          Consultant(s) Notes Reviewed:      Care Discussed with Consultants/Other Providers:

## 2021-05-09 NOTE — CHART NOTE - NSCHARTNOTEFT_GEN_A_CORE
Midline placed by IV team - Awaiting CT coronaries - continue to hold Eliquis per Dr. Gimenez    [id number]
Patient was given Eliquis this morning. Per IV team need to be off Eliquis x 24 hrs for Midline.   Held Eliquis per d/w Dr. Gimenez    [id number]
[facility] Chart Note    CTA heart coronaries ordered per cardiology today.  However patient in need of 18 gauge needle or midline for above test.   Unsuccessful attempts on floor made for IV placement. Contacted MICU for possible arrow placement- MICU unable to do  Discussed with PICC team that midline can be placed. Also discussed with CT department that midline is sufficient for test.  PICC team to place midline tomorrow- Eliquis needs to be held this evening and tomorrow AM- Dr. Gimenez made aware and in agreement.  Confirmed with Dr. Gimenez, heparin gtt is not necessary when Eliquis is held.    RN made aware of plan.    Lesa Vieyra PA-C  Dept of Medicine  #[id number]

## 2021-05-10 LAB
GLUCOSE BLDC GLUCOMTR-MCNC: 102 MG/DL — HIGH (ref 70–99)
GLUCOSE BLDC GLUCOMTR-MCNC: 109 MG/DL — HIGH (ref 70–99)
GLUCOSE BLDC GLUCOMTR-MCNC: 125 MG/DL — HIGH (ref 70–99)
SARS-COV-2 RNA SPEC QL NAA+PROBE: SIGNIFICANT CHANGE UP

## 2021-05-10 PROCEDURE — 75574 CT ANGIO HRT W/3D IMAGE: CPT | Mod: 26

## 2021-05-10 RX ORDER — METOPROLOL TARTRATE 50 MG
5 TABLET ORAL ONCE
Refills: 0 | Status: COMPLETED | OUTPATIENT
Start: 2021-05-10 | End: 2021-05-10

## 2021-05-10 RX ORDER — ALPRAZOLAM 0.25 MG
1 TABLET ORAL ONCE
Refills: 0 | Status: DISCONTINUED | OUTPATIENT
Start: 2021-05-10 | End: 2021-05-10

## 2021-05-10 RX ORDER — METOPROLOL TARTRATE 50 MG
1 TABLET ORAL ONCE
Refills: 0 | Status: DISCONTINUED | OUTPATIENT
Start: 2021-05-10 | End: 2021-05-10

## 2021-05-10 RX ORDER — ACETAMINOPHEN 500 MG
650 TABLET ORAL ONCE
Refills: 0 | Status: COMPLETED | OUTPATIENT
Start: 2021-05-10 | End: 2021-05-10

## 2021-05-10 RX ADMIN — Medication 100 MILLIGRAM(S): at 05:17

## 2021-05-10 RX ADMIN — Medication 650 MILLIGRAM(S): at 20:59

## 2021-05-10 RX ADMIN — ATORVASTATIN CALCIUM 20 MILLIGRAM(S): 80 TABLET, FILM COATED ORAL at 23:59

## 2021-05-10 RX ADMIN — Medication 1 MILLIGRAM(S): at 10:20

## 2021-05-10 RX ADMIN — Medication 50 MICROGRAM(S): at 05:17

## 2021-05-10 RX ADMIN — SENNA PLUS 2 TABLET(S): 8.6 TABLET ORAL at 23:59

## 2021-05-10 RX ADMIN — Medication 81 MILLIGRAM(S): at 12:29

## 2021-05-10 RX ADMIN — DULOXETINE HYDROCHLORIDE 60 MILLIGRAM(S): 30 CAPSULE, DELAYED RELEASE ORAL at 15:30

## 2021-05-10 RX ADMIN — Medication 650 MILLIGRAM(S): at 21:59

## 2021-05-10 RX ADMIN — Medication 100 MILLIGRAM(S): at 23:59

## 2021-05-10 RX ADMIN — Medication 5 MILLIGRAM(S): at 11:09

## 2021-05-10 NOTE — DIETITIAN INITIAL EVALUATION ADULT. - PERSON TAUGHT/METHOD
Discussed heart healthy diet recommendations in addition to pre-DM diet recommendations. Pt was very receptive to education, motivated to change./verbal instruction/written material/patient instructed

## 2021-05-10 NOTE — DIETITIAN INITIAL EVALUATION ADULT. - ORAL INTAKE PTA/DIET HISTORY
Pt reports good PO intake at home, increased PO intake during the pandemic with weight gain. She often eats take out for convenience, though she shows interest/motivation in weight loss. HbA1c is 5.8% pt reports she has pre-DM and takes metformin, A1c decreased per pt. Pt reads nutrition labels

## 2021-05-10 NOTE — PROGRESS NOTE ADULT - ASSESSMENT
80 yo female   PMH htn, hld, dm, anxiety, hypothyroidism   presenting with feeling of anxiety and cp       felt as if she had a panic attack No cardiac workup in the past.     : New onset A.fib.,  now in nsr      tele. CHADVAC score 3.     on   Lovenox 110 mg bid, transition to NOAC later   tele, pt in NSR now  DM, follow  fs,  on   Synthroid  50 mcg   CT  angio chest/, no PE/    echo , normal ef   positive  stress  test/ [pe r card,  CT with  coronaries/  vx  cath,  defer to  card    anemia,  hb stable,  / last  colonoscopy was  5 yrs ago  pt refusing  cath/  has  no  peripheral  access,  therefore  awiating  mid  line.  eliquis  on  hold   await ct coronary  today

## 2021-05-10 NOTE — DIETITIAN INITIAL EVALUATION ADULT. - ADD RECOMMEND
Please add DASH restriction to diet. Monitor PO intake, GI tolerance, skin integrity and labs. Pt is aware RD remains available.

## 2021-05-10 NOTE — DIETITIAN INITIAL EVALUATION ADULT. - OTHER INFO
This admission: Good PO intake.    Confirms NKFA, no nausea/vomiting, no difficulty chewing/swallowing, no GI distress, no micronutrient supplementation PTA. No diarrhea/constipation.    Weight Hx: UBW is 190 pounds, but pt endorses wt gain over the past couple of years and during the pandemic. Dosing wt is 250 pounds (5/3) standing weights 265 pounds (5/8), 265 pounds (5/7), 264.4 pounds (5/6), 262.7 pounds (5/5). Standing weights are likely more accurate than dosing wt.

## 2021-05-10 NOTE — DIETITIAN INITIAL EVALUATION ADULT. - PERTINENT MEDS FT
MEDICATIONS  (STANDING):  aspirin enteric coated 81 milliGRAM(s) Oral daily  atorvastatin 20 milliGRAM(s) Oral at bedtime  DULoxetine 60 milliGRAM(s) Oral daily  levothyroxine 50 MICROGram(s) Oral daily  metoprolol tartrate 100 milliGRAM(s) Oral two times a day  senna 2 Tablet(s) Oral at bedtime

## 2021-05-10 NOTE — PROGRESS NOTE ADULT - SUBJECTIVE AND OBJECTIVE BOX
afebrile    REVIEW OF SYSTEMS:  GEN: no fever,    no chills  RESP: no SOB,   no cough  CVS: no chest pain,   no palpitations  GI: no abdominal pain,   no nausea,   no vomiting,   no constipation,   no diarrhea  : no dysuria,   no frequency  NEURO: no headache,   no dizziness  PSYCH: no depression,   not anxious  Derm : no rash    MEDICATIONS  (STANDING):  aspirin enteric coated 81 milliGRAM(s) Oral daily  atorvastatin 20 milliGRAM(s) Oral at bedtime  DULoxetine 60 milliGRAM(s) Oral daily  levothyroxine 50 MICROGram(s) Oral daily  metoprolol tartrate 100 milliGRAM(s) Oral two times a day  metoprolol tartrate Injectable 5 milliGRAM(s) IV Push once  senna 2 Tablet(s) Oral at bedtime    MEDICATIONS  (PRN):  polyethylene glycol 3350 17 Gram(s) Oral daily PRN Constipation      Vital Signs Last 24 Hrs  T(C): 36.8 (10 May 2021 04:05), Max: 36.8 (10 May 2021 04:05)  T(F): 98.2 (10 May 2021 04:05), Max: 98.2 (10 May 2021 04:05)  HR: 90 (10 May 2021 04:05) (63 - 90)  BP: 132/40 (10 May 2021 04:05) (126/56 - 136/67)  BP(mean): --  RR: 18 (10 May 2021 04:05) (18 - 18)  SpO2: 95% (10 May 2021 04:05) (95% - 97%)  CAPILLARY BLOOD GLUCOSE      POCT Blood Glucose.: 125 mg/dL (10 May 2021 07:53)  POCT Blood Glucose.: 135 mg/dL (09 May 2021 22:09)  POCT Blood Glucose.: 144 mg/dL (09 May 2021 17:51)  POCT Blood Glucose.: 128 mg/dL (09 May 2021 11:26)    I&O's Summary    09 May 2021 07:01  -  10 May 2021 07:00  --------------------------------------------------------  IN: 900 mL / OUT: 0 mL / NET: 900 mL        PHYSICAL EXAM:  HEAD:  Atraumatic, Normocephalic  NECK: Supple, No   JVD  CHEST/LUNG:   no     rales,     no,    rhonchi  HEART: Regular rate and rhythm;         murmur  ABDOMEN: Soft, Nontender, ;   EXTREMITIES:   no     edema  NEUROLOGY:  alert    LABS:                        10.2   7.33  )-----------( 278      ( 08 May 2021 11:38 )             32.4     05-08    139  |  103  |  16  ----------------------------<  115<H>  4.1   |  22  |  0.75    Ca    9.0      08 May 2021 11:38    TPro  7.1  /  Alb  3.5  /  TBili  0.2  /  DBili  x   /  AST  20  /  ALT  19  /  AlkPhos  89  05-08                    Thyroid Stimulating Hormone, Serum: 1.88 uIU/mL (05-04 @ 09:19)          Consultant(s) Notes Reviewed:      Care Discussed with Consultants/Other Providers:

## 2021-05-10 NOTE — PROGRESS NOTE ADULT - SUBJECTIVE AND OBJECTIVE BOX
CARDIOLOGY     PROGRESS  NOTE   ________________________________________________    CHIEF COMPLAINT:Patient is a 81y old  Female who presents with a chief complaint of Anxiety with rapid heart rate. (10 May 2021 08:25)  no complain.  	  REVIEW OF SYSTEMS:  CONSTITUTIONAL: No fever, weight loss, or fatigue  EYES: No eye pain, visual disturbances, or discharge  ENT:  No difficulty hearing, tinnitus, vertigo; No sinus or throat pain  NECK: No pain or stiffness  RESPIRATORY: No cough, wheezing, chills or hemoptysis; no Shortness of Breath  CARDIOVASCULAR: No chest pain, palpitations, passing out, dizziness, or leg swelling  GASTROINTESTINAL: No abdominal or epigastric pain. No nausea, vomiting, or hematemesis; No diarrhea or constipation. No melena or hematochezia.  GENITOURINARY: No dysuria, frequency, hematuria, or incontinence  NEUROLOGICAL: No headaches, memory loss, loss of strength, numbness, or tremors  SKIN: No itching, burning, rashes, or lesions   LYMPH Nodes: No enlarged glands  ENDOCRINE: No heat or cold intolerance; No hair loss  MUSCULOSKELETAL: No joint pain or swelling; No muscle, back, or extremity pain  PSYCHIATRIC: No depression, anxiety, mood swings, or difficulty sleeping  HEME/LYMPH: No easy bruising, or bleeding gums  ALLERGY AND IMMUNOLOGIC: No hives or eczema	    [ ] All others negative	  [ ] Unable to obtain    PHYSICAL EXAM:  T(C): 36.8 (05-10-21 @ 04:05), Max: 36.8 (05-10-21 @ 04:05)  HR: 90 (05-10-21 @ 04:05) (63 - 90)  BP: 132/40 (05-10-21 @ 04:05) (126/56 - 136/67)  RR: 18 (05-10-21 @ 04:05) (18 - 18)  SpO2: 95% (05-10-21 @ 04:05) (95% - 97%)  Wt(kg): --  I&O's Summary    09 May 2021 07:01  -  10 May 2021 07:00  --------------------------------------------------------  IN: 900 mL / OUT: 0 mL / NET: 900 mL        Appearance: Normal	  HEENT:   Normal oral mucosa, PERRL, EOMI	  Lymphatic: No lymphadenopathy  Cardiovascular: Normal S1 S2, No JVD, + murmurs, No edema  Respiratory: Lungs clear to auscultation	  Psychiatry: A & O x 3, Mood & affect appropriate  Gastrointestinal:  Soft, Non-tender, + BS	  Skin: No rashes, No ecchymoses, No cyanosis	  Neurologic: Non-focal  Extremities: Normal range of motion, No clubbing, cyanosis or edema  Vascular: Peripheral pulses palpable 2+ bilaterally    MEDICATIONS  (STANDING):  aspirin enteric coated 81 milliGRAM(s) Oral daily  atorvastatin 20 milliGRAM(s) Oral at bedtime  DULoxetine 60 milliGRAM(s) Oral daily  levothyroxine 50 MICROGram(s) Oral daily  metoprolol tartrate 100 milliGRAM(s) Oral two times a day  metoprolol tartrate Injectable 5 milliGRAM(s) IV Push once  senna 2 Tablet(s) Oral at bedtime      TELEMETRY: 	    ECG:  	  RADIOLOGY:  OTHER: 	  	  LABS:	 	    CARDIAC MARKERS:                                10.2   7.33  )-----------( 278      ( 08 May 2021 11:38 )             32.4     05-08    139  |  103  |  16  ----------------------------<  115<H>  4.1   |  22  |  0.75    Ca    9.0      08 May 2021 11:38    TPro  7.1  /  Alb  3.5  /  TBili  0.2  /  DBili  x   /  AST  20  /  ALT  19  /  AlkPhos  89  05-08    proBNP:   Lipid Profile: Cholesterol 120  LDL --  HDL 40  TG 63    HgA1c:   TSH: Thyroid Stimulating Hormone, Serum: 1.88 uIU/mL (05-04 @ 09:19)  Thyroid Stimulating Hormone, Serum: 1.59 uIU/mL (05-03 @ 20:45)          Assessment and plan  ---------------------------  82 yo female PMH htn, hld, dm, anxiety, hypothyroidism presenting with feeling of anxiety and chest discomfort starting yesterday. Pt states she may have had spicy food and afterwards started to feel gas-like pressure in the center of her chest which would resolve with burping, however sensation would return. Today pt also felt as if she had a panic attack which she describes as a feeling as if she needs to go to sleep and lay down but unable to 2/2 racing thoughts. No cardiac workup in the past. Pt otherwise feels well no active CP currently. took on baby aspirin today. Pt states shes mostly concerned about her anxiety which she usually feels daily and has been worse over the past 6 months. Pt follows with psychiatrist and pcp regularly. no fevers, chills, nausea, vomiting, abdominal pain, diarrhea, constipation.   pt with sig cardiac risk factor with hx of htn increase cholesterol with palpitation and new onset of a.fib.  tele noted converted to NSR  beta blocker to control hr, continue 50 mg tid  tsh  lipid panel  cardiac enzymes are all negative  echo  will consider ischemia work up based on echo results  cta r/o PE negative  continue AC ?switch to apixaban  stress test, + will need cath or will consider cardiac ct, discussed with pt does not want cath ?false positive stress test  increase metoprolol to 100 mg bid  awaiting cardiac ct, pt is very anxious

## 2021-05-10 NOTE — DIETITIAN INITIAL EVALUATION ADULT. - CHIEF COMPLAINT
82 yo female PMH htn, hld, dm, anxiety, hypothyroidism presenting with feeling of anxiety and chest discomfort. Awaiting cardiac ct

## 2021-05-11 LAB
ANION GAP SERPL CALC-SCNC: 16 MMOL/L — SIGNIFICANT CHANGE UP (ref 5–17)
BUN SERPL-MCNC: 15 MG/DL — SIGNIFICANT CHANGE UP (ref 7–23)
CALCIUM SERPL-MCNC: 8.9 MG/DL — SIGNIFICANT CHANGE UP (ref 8.4–10.5)
CHLORIDE SERPL-SCNC: 103 MMOL/L — SIGNIFICANT CHANGE UP (ref 96–108)
CO2 SERPL-SCNC: 20 MMOL/L — LOW (ref 22–31)
CREAT SERPL-MCNC: 0.77 MG/DL — SIGNIFICANT CHANGE UP (ref 0.5–1.3)
GLUCOSE BLDC GLUCOMTR-MCNC: 119 MG/DL — HIGH (ref 70–99)
GLUCOSE BLDC GLUCOMTR-MCNC: 121 MG/DL — HIGH (ref 70–99)
GLUCOSE BLDC GLUCOMTR-MCNC: 97 MG/DL — SIGNIFICANT CHANGE UP (ref 70–99)
GLUCOSE BLDC GLUCOMTR-MCNC: 98 MG/DL — SIGNIFICANT CHANGE UP (ref 70–99)
GLUCOSE SERPL-MCNC: 118 MG/DL — HIGH (ref 70–99)
POTASSIUM SERPL-MCNC: 4.1 MMOL/L — SIGNIFICANT CHANGE UP (ref 3.5–5.3)
POTASSIUM SERPL-SCNC: 4.1 MMOL/L — SIGNIFICANT CHANGE UP (ref 3.5–5.3)
SODIUM SERPL-SCNC: 139 MMOL/L — SIGNIFICANT CHANGE UP (ref 135–145)

## 2021-05-11 PROCEDURE — 75574 CT ANGIO HRT W/3D IMAGE: CPT | Mod: 26

## 2021-05-11 RX ORDER — ALPRAZOLAM 0.25 MG
1 TABLET ORAL ONCE
Refills: 0 | Status: DISCONTINUED | OUTPATIENT
Start: 2021-05-11 | End: 2021-05-11

## 2021-05-11 RX ORDER — SODIUM CHLORIDE 9 MG/ML
1000 INJECTION INTRAMUSCULAR; INTRAVENOUS; SUBCUTANEOUS
Refills: 0 | Status: DISCONTINUED | OUTPATIENT
Start: 2021-05-11 | End: 2021-05-12

## 2021-05-11 RX ORDER — APIXABAN 2.5 MG/1
5 TABLET, FILM COATED ORAL
Refills: 0 | Status: DISCONTINUED | OUTPATIENT
Start: 2021-05-11 | End: 2021-05-12

## 2021-05-11 RX ADMIN — Medication 100 MILLIGRAM(S): at 17:29

## 2021-05-11 RX ADMIN — APIXABAN 5 MILLIGRAM(S): 2.5 TABLET, FILM COATED ORAL at 20:41

## 2021-05-11 RX ADMIN — Medication 1 MILLIGRAM(S): at 15:29

## 2021-05-11 RX ADMIN — SODIUM CHLORIDE 50 MILLILITER(S): 9 INJECTION INTRAMUSCULAR; INTRAVENOUS; SUBCUTANEOUS at 08:53

## 2021-05-11 RX ADMIN — ATORVASTATIN CALCIUM 20 MILLIGRAM(S): 80 TABLET, FILM COATED ORAL at 21:22

## 2021-05-11 RX ADMIN — Medication 81 MILLIGRAM(S): at 11:39

## 2021-05-11 RX ADMIN — Medication 100 MILLIGRAM(S): at 08:55

## 2021-05-11 RX ADMIN — Medication 50 MICROGRAM(S): at 06:04

## 2021-05-11 RX ADMIN — DULOXETINE HYDROCHLORIDE 60 MILLIGRAM(S): 30 CAPSULE, DELAYED RELEASE ORAL at 11:39

## 2021-05-11 NOTE — PHYSICAL THERAPY INITIAL EVALUATION ADULT - PERTINENT HX OF CURRENT PROBLEM, REHAB EVAL
82 yo F PMH htn, hld, dm, anxiety, hypothyroidism presenting with feeling of anxiety and chest discomfort. Pt with sig cardiac risk factor with hx of htn, increase cholesterol with palpitation and new onset of a. fib. Tele noted converted to NSR. Will consider ischemia work up based on echo results. CTA r/o PE negative.

## 2021-05-11 NOTE — PROGRESS NOTE ADULT - SUBJECTIVE AND OBJECTIVE BOX
REVIEW OF SYSTEMS:  GEN: no fever,    no chills  RESP: no SOB,   no cough  CVS: no chest pain,   no palpitations  GI: no abdominal pain,   no nausea,   no vomiting,   no constipation,   no diarrhea  : no dysuria,   no frequency  NEURO: no headache,   no dizziness  PSYCH: no depression,   not anxious  Derm : no rash    MEDICATIONS  (STANDING):  aspirin enteric coated 81 milliGRAM(s) Oral daily  atorvastatin 20 milliGRAM(s) Oral at bedtime  DULoxetine 60 milliGRAM(s) Oral daily  levothyroxine 50 MICROGram(s) Oral daily  metoprolol tartrate 100 milliGRAM(s) Oral two times a day  senna 2 Tablet(s) Oral at bedtime  sodium chloride 0.9%. 1000 milliLiter(s) (50 mL/Hr) IV Continuous <Continuous>    MEDICATIONS  (PRN):  polyethylene glycol 3350 17 Gram(s) Oral daily PRN Constipation      Vital Signs Last 24 Hrs  T(C): 36.5 (11 May 2021 05:47), Max: 36.6 (10 May 2021 11:09)  T(F): 97.7 (11 May 2021 05:47), Max: 97.9 (10 May 2021 11:09)  HR: 87 (11 May 2021 05:47) (85 - 90)  BP: 153/86 (11 May 2021 05:47) (149/66 - 161/73)  BP(mean): --  RR: 18 (11 May 2021 05:47) (18 - 18)  SpO2: 96% (11 May 2021 05:47) (96% - 97%)  CAPILLARY BLOOD GLUCOSE      POCT Blood Glucose.: 119 mg/dL (11 May 2021 07:51)  POCT Blood Glucose.: 109 mg/dL (10 May 2021 16:51)  POCT Blood Glucose.: 109 mg/dL (10 May 2021 16:51)  POCT Blood Glucose.: 109 mg/dL (10 May 2021 16:51)  POCT Blood Glucose.: 109 mg/dL (10 May 2021 16:51)  POCT Blood Glucose.: 102 mg/dL (10 May 2021 12:00)    I&O's Summary    10 May 2021 07:01  -  11 May 2021 07:00  --------------------------------------------------------  IN: 240 mL / OUT: 0 mL / NET: 240 mL     afebrile    PHYSICAL EXAM:  HEAD:  Atraumatic, Normocephalic  NECK: Supple, No   JVD  CHEST/LUNG:   no     rales,     no,    rhonchi  HEART: Regular rate and rhythm;         murmur  ABDOMEN: Soft, Nontender, ;   EXTREMITIES:     no   edema  NEUROLOGY:  alert    LABS:                          Thyroid Stimulating Hormone, Serum: 1.88 uIU/mL (05-04 @ 09:19)          Consultant(s) Notes Reviewed:      Care Discussed with Consultants/Other Providers:

## 2021-05-11 NOTE — PROGRESS NOTE ADULT - SUBJECTIVE AND OBJECTIVE BOX
CARDIOLOGY     PROGRESS  NOTE   ________________________________________________    CHIEF COMPLAINT:Patient is a 81y old  Female who presents with a chief complaint of Anxiety with rapid heart rate. (10 May 2021 15:09)  no complain.  	  REVIEW OF SYSTEMS:  CONSTITUTIONAL: No fever, weight loss, or fatigue  EYES: No eye pain, visual disturbances, or discharge  ENT:  No difficulty hearing, tinnitus, vertigo; No sinus or throat pain  NECK: No pain or stiffness  RESPIRATORY: No cough, wheezing, chills or hemoptysis; No Shortness of Breath  CARDIOVASCULAR: No chest pain, palpitations, passing out, dizziness, or leg swelling  GASTROINTESTINAL: No abdominal or epigastric pain. No nausea, vomiting, or hematemesis; No diarrhea or constipation. No melena or hematochezia.  GENITOURINARY: No dysuria, frequency, hematuria, or incontinence  NEUROLOGICAL: No headaches, memory loss, loss of strength, numbness, or tremors  SKIN: No itching, burning, rashes, or lesions   LYMPH Nodes: No enlarged glands  ENDOCRINE: No heat or cold intolerance; No hair loss  MUSCULOSKELETAL: No joint pain or swelling; No muscle, back, or extremity pain  PSYCHIATRIC: No depression, anxiety, mood swings, or difficulty sleeping  HEME/LYMPH: No easy bruising, or bleeding gums  ALLERGY AND IMMUNOLOGIC: No hives or eczema	    [ ] All others negative	  [ ] Unable to obtain    PHYSICAL EXAM:  T(C): 36.5 (05-11-21 @ 05:47), Max: 36.6 (05-10-21 @ 11:09)  HR: 87 (05-11-21 @ 05:47) (85 - 90)  BP: 153/86 (05-11-21 @ 05:47) (149/66 - 161/73)  RR: 18 (05-11-21 @ 05:47) (18 - 18)  SpO2: 96% (05-11-21 @ 05:47) (96% - 97%)  Wt(kg): --  I&O's Summary    10 May 2021 07:01  -  11 May 2021 07:00  --------------------------------------------------------  IN: 240 mL / OUT: 0 mL / NET: 240 mL        Appearance: Normal	  HEENT:   Normal oral mucosa, PERRL, EOMI	  Lymphatic: No lymphadenopathy  Cardiovascular: Normal S1 S2, No JVD, N+ murmurs, No edema  Respiratory: Lungs clear to auscultation	  Psychiatry: A & O x 3, Mood & affect appropriate  Gastrointestinal:  Soft, Non-tender, + BS	  Skin: No rashes, No ecchymoses, No cyanosis	  Neurologic: Non-focal  Extremities: Normal range of motion, No clubbing, cyanosis or edema  Vascular: Peripheral pulses palpable 2+ bilaterally    MEDICATIONS  (STANDING):  aspirin enteric coated 81 milliGRAM(s) Oral daily  atorvastatin 20 milliGRAM(s) Oral at bedtime  DULoxetine 60 milliGRAM(s) Oral daily  levothyroxine 50 MICROGram(s) Oral daily  metoprolol tartrate 100 milliGRAM(s) Oral two times a day  senna 2 Tablet(s) Oral at bedtime      TELEMETRY: 	    ECG:  	  RADIOLOGY:  OTHER: 	  	  LABS:	 	    CARDIAC MARKERS:                  proBNP:   Lipid Profile: Cholesterol 120  LDL --  HDL 40  TG 63    HgA1c:   TSH: Thyroid Stimulating Hormone, Serum: 1.88 uIU/mL (05-04 @ 09:19)  Thyroid Stimulating Hormone, Serum: 1.59 uIU/mL (05-03 @ 20:45)    < from: CT Angio Heart and Coronaries w/ IV Cont (05.10.21 @ 16:34) >  1.  Non-diagnostic coronary computed tomography angiography due to a non-gated arterial-phase acquisition.  Recommend repeat image acquisition with electrocardiographic gating.  2.  Minimal coronary artery calcium (Agatston score 10) as delineated above.  3.  No left atrial appendage thrombus.    < end of copied text >        Assessment and plan  ---------------------------  82 yo female PMH htn, hld, dm, anxiety, hypothyroidism presenting with feeling of anxiety and chest discomfort starting yesterday. Pt states she may have had spicy food and afterwards started to feel gas-like pressure in the center of her chest which would resolve with burping, however sensation would return. Today pt also felt as if she had a panic attack which she describes as a feeling as if she needs to go to sleep and lay down but unable to 2/2 racing thoughts. No cardiac workup in the past. Pt otherwise feels well no active CP currently. took on baby aspirin today. Pt states shes mostly concerned about her anxiety which she usually feels daily and has been worse over the past 6 months. Pt follows with psychiatrist and pcp regularly. no fevers, chills, nausea, vomiting, abdominal pain, diarrhea, constipation.   pt with sig cardiac risk factor with hx of htn increase cholesterol with palpitation and new onset of a.fib.  tele noted converted to NSR  beta blocker to control hr, continue 50 mg tid  tsh  lipid panel  cardiac enzymes are all negative  echo  will consider ischemia work up based on echo results  cta r/o PE negative  continue AC ?switch to apixaban  stress test, + will need cath or will consider cardiac ct, discussed with pt does not want cath ?false positive stress test  increase metoprolol to 100 mg bid  non diagnostic CT heart spoke to DR wu, will repeat

## 2021-05-11 NOTE — PROGRESS NOTE ADULT - ASSESSMENT
82 yo female   PMH htn, hld, dm, anxiety, hypothyroidism   presenting with feeling of anxiety and cp       felt as if she had a panic attack No cardiac workup in the past.     : New onset A.fib.,  now in nsr      tele. CHADVAC score 3.     on   Lovenox 110 mg bid, transition to NOAC later   tele, pt in NSR now  DM, follow  fs,  on   Synthroid  50 mcg   CT  angio chest/, no PE/    echo , normal ef   positive  stress  test/ [pe r card,  CT with  coronaries/  vx  cath,  defer to  card    anemia,  hb stable,  / last  colonoscopy was  5 yrs ago  pt refusing  cath/  has  no  peripheral  access,  therefore  awiating  mid  line.  eliquis  on  hold   t ct coronary  , non diagnostic/ may need  test repeated

## 2021-05-11 NOTE — PHYSICAL THERAPY INITIAL EVALUATION ADULT - ADDITIONAL COMMENTS
PTA pt independent w/ straight cane for ambulation and independent w/ ADLs. Says niece will stay with her for a few days post d/c to assist if needed.

## 2021-05-12 ENCOUNTER — TRANSCRIPTION ENCOUNTER (OUTPATIENT)
Age: 81
End: 2021-05-12

## 2021-05-12 VITALS
TEMPERATURE: 98 F | DIASTOLIC BLOOD PRESSURE: 65 MMHG | HEART RATE: 99 BPM | SYSTOLIC BLOOD PRESSURE: 136 MMHG | OXYGEN SATURATION: 94 % | RESPIRATION RATE: 18 BRPM

## 2021-05-12 DIAGNOSIS — I10 ESSENTIAL (PRIMARY) HYPERTENSION: ICD-10-CM

## 2021-05-12 LAB
ANION GAP SERPL CALC-SCNC: 14 MMOL/L — SIGNIFICANT CHANGE UP (ref 5–17)
BUN SERPL-MCNC: 19 MG/DL — SIGNIFICANT CHANGE UP (ref 7–23)
CALCIUM SERPL-MCNC: 9.1 MG/DL — SIGNIFICANT CHANGE UP (ref 8.4–10.5)
CHLORIDE SERPL-SCNC: 104 MMOL/L — SIGNIFICANT CHANGE UP (ref 96–108)
CO2 SERPL-SCNC: 21 MMOL/L — LOW (ref 22–31)
CREAT SERPL-MCNC: 0.87 MG/DL — SIGNIFICANT CHANGE UP (ref 0.5–1.3)
GLUCOSE BLDC GLUCOMTR-MCNC: 100 MG/DL — HIGH (ref 70–99)
GLUCOSE BLDC GLUCOMTR-MCNC: 104 MG/DL — HIGH (ref 70–99)
GLUCOSE SERPL-MCNC: 108 MG/DL — HIGH (ref 70–99)
POTASSIUM SERPL-MCNC: 4 MMOL/L — SIGNIFICANT CHANGE UP (ref 3.5–5.3)
POTASSIUM SERPL-SCNC: 4 MMOL/L — SIGNIFICANT CHANGE UP (ref 3.5–5.3)
SODIUM SERPL-SCNC: 139 MMOL/L — SIGNIFICANT CHANGE UP (ref 135–145)

## 2021-05-12 PROCEDURE — 71275 CT ANGIOGRAPHY CHEST: CPT

## 2021-05-12 PROCEDURE — 36569 INSJ PICC 5 YR+ W/O IMAGING: CPT

## 2021-05-12 PROCEDURE — 78452 HT MUSCLE IMAGE SPECT MULT: CPT

## 2021-05-12 PROCEDURE — 83690 ASSAY OF LIPASE: CPT

## 2021-05-12 PROCEDURE — 82550 ASSAY OF CK (CPK): CPT

## 2021-05-12 PROCEDURE — 85730 THROMBOPLASTIN TIME PARTIAL: CPT

## 2021-05-12 PROCEDURE — 85610 PROTHROMBIN TIME: CPT

## 2021-05-12 PROCEDURE — 99285 EMERGENCY DEPT VISIT HI MDM: CPT

## 2021-05-12 PROCEDURE — 85027 COMPLETE CBC AUTOMATED: CPT

## 2021-05-12 PROCEDURE — 97161 PT EVAL LOW COMPLEX 20 MIN: CPT

## 2021-05-12 PROCEDURE — 93017 CV STRESS TEST TRACING ONLY: CPT

## 2021-05-12 PROCEDURE — U0005: CPT

## 2021-05-12 PROCEDURE — 83036 HEMOGLOBIN GLYCOSYLATED A1C: CPT

## 2021-05-12 PROCEDURE — 84443 ASSAY THYROID STIM HORMONE: CPT

## 2021-05-12 PROCEDURE — 80048 BASIC METABOLIC PNL TOTAL CA: CPT

## 2021-05-12 PROCEDURE — 84484 ASSAY OF TROPONIN QUANT: CPT

## 2021-05-12 PROCEDURE — C8929: CPT

## 2021-05-12 PROCEDURE — 83735 ASSAY OF MAGNESIUM: CPT

## 2021-05-12 PROCEDURE — 85025 COMPLETE CBC W/AUTO DIFF WBC: CPT

## 2021-05-12 PROCEDURE — 86769 SARS-COV-2 COVID-19 ANTIBODY: CPT

## 2021-05-12 PROCEDURE — 82962 GLUCOSE BLOOD TEST: CPT

## 2021-05-12 PROCEDURE — U0003: CPT

## 2021-05-12 PROCEDURE — 75574 CT ANGIO HRT W/3D IMAGE: CPT

## 2021-05-12 PROCEDURE — C1751: CPT

## 2021-05-12 PROCEDURE — 80061 LIPID PANEL: CPT

## 2021-05-12 PROCEDURE — 80053 COMPREHEN METABOLIC PANEL: CPT

## 2021-05-12 PROCEDURE — 96374 THER/PROPH/DIAG INJ IV PUSH: CPT

## 2021-05-12 PROCEDURE — A9500: CPT

## 2021-05-12 PROCEDURE — 71045 X-RAY EXAM CHEST 1 VIEW: CPT

## 2021-05-12 PROCEDURE — 82553 CREATINE MB FRACTION: CPT

## 2021-05-12 RX ORDER — POLYETHYLENE GLYCOL 3350 17 G/17G
17 POWDER, FOR SOLUTION ORAL
Qty: 0 | Refills: 0 | DISCHARGE
Start: 2021-05-12

## 2021-05-12 RX ORDER — DULOXETINE HYDROCHLORIDE 30 MG/1
1 CAPSULE, DELAYED RELEASE ORAL
Qty: 0 | Refills: 0 | DISCHARGE
Start: 2021-05-12

## 2021-05-12 RX ORDER — LEVOTHYROXINE SODIUM 125 MCG
1 TABLET ORAL
Qty: 0 | Refills: 0 | DISCHARGE
Start: 2021-05-12

## 2021-05-12 RX ORDER — LOSARTAN POTASSIUM 100 MG/1
1 TABLET, FILM COATED ORAL
Qty: 0 | Refills: 0 | DISCHARGE

## 2021-05-12 RX ORDER — DULOXETINE HYDROCHLORIDE 30 MG/1
1 CAPSULE, DELAYED RELEASE ORAL
Qty: 0 | Refills: 0 | DISCHARGE

## 2021-05-12 RX ORDER — ASPIRIN/CALCIUM CARB/MAGNESIUM 324 MG
1 TABLET ORAL
Qty: 0 | Refills: 0 | DISCHARGE

## 2021-05-12 RX ORDER — METOPROLOL TARTRATE 50 MG
1 TABLET ORAL
Qty: 60 | Refills: 0
Start: 2021-05-12 | End: 2021-06-10

## 2021-05-12 RX ORDER — METOPROLOL TARTRATE 50 MG
1 TABLET ORAL
Qty: 0 | Refills: 0 | DISCHARGE

## 2021-05-12 RX ORDER — ASPIRIN/CALCIUM CARB/MAGNESIUM 324 MG
1 TABLET ORAL
Qty: 0 | Refills: 0 | DISCHARGE
Start: 2021-05-12

## 2021-05-12 RX ORDER — APIXABAN 2.5 MG/1
1 TABLET, FILM COATED ORAL
Qty: 60 | Refills: 0
Start: 2021-05-12 | End: 2021-06-10

## 2021-05-12 RX ORDER — SENNA PLUS 8.6 MG/1
2 TABLET ORAL
Qty: 0 | Refills: 0 | DISCHARGE
Start: 2021-05-12

## 2021-05-12 RX ORDER — LEVOTHYROXINE SODIUM 125 MCG
1 TABLET ORAL
Qty: 0 | Refills: 0 | DISCHARGE

## 2021-05-12 RX ADMIN — APIXABAN 5 MILLIGRAM(S): 2.5 TABLET, FILM COATED ORAL at 08:52

## 2021-05-12 RX ADMIN — Medication 100 MILLIGRAM(S): at 05:56

## 2021-05-12 RX ADMIN — Medication 50 MICROGRAM(S): at 05:56

## 2021-05-12 RX ADMIN — Medication 81 MILLIGRAM(S): at 11:49

## 2021-05-12 RX ADMIN — DULOXETINE HYDROCHLORIDE 60 MILLIGRAM(S): 30 CAPSULE, DELAYED RELEASE ORAL at 11:50

## 2021-05-12 NOTE — PROGRESS NOTE ADULT - SUBJECTIVE AND OBJECTIVE BOX
CARDIOLOGY     PROGRESS  NOTE   ________________________________________________    CHIEF COMPLAINT:Patient is a 81y old  Female who presents with a chief complaint of Anxiety with rapid heart rate. (11 May 2021 08:44)  no complain  	  REVIEW OF SYSTEMS:  CONSTITUTIONAL: No fever, weight loss, or fatigue  EYES: No eye pain, visual disturbances, or discharge  ENT:  No difficulty hearing, tinnitus, vertigo; No sinus or throat pain  NECK: No pain or stiffness  RESPIRATORY: No cough, wheezing, chills or hemoptysis; No Shortness of Breath  CARDIOVASCULAR: No chest pain, palpitations, passing out, dizziness, or leg swelling  GASTROINTESTINAL: No abdominal or epigastric pain. No nausea, vomiting, or hematemesis; No diarrhea or constipation. No melena or hematochezia.  GENITOURINARY: No dysuria, frequency, hematuria, or incontinence  NEUROLOGICAL: No headaches, memory loss, loss of strength, numbness, or tremors  SKIN: No itching, burning, rashes, or lesions   LYMPH Nodes: No enlarged glands  ENDOCRINE: No heat or cold intolerance; No hair loss  MUSCULOSKELETAL: No joint pain or swelling; No muscle, back, or extremity pain  PSYCHIATRIC: No depression, anxiety, mood swings, or difficulty sleeping  HEME/LYMPH: No easy bruising, or bleeding gums  ALLERGY AND IMMUNOLOGIC: No hives or eczema	    [ ] All others negative	  [ ] Unable to obtain    PHYSICAL EXAM:  T(C): 36.8 (05-12-21 @ 05:26), Max: 36.8 (05-12-21 @ 05:26)  HR: 72 (05-12-21 @ 05:26) (70 - 93)  BP: 129/76 (05-12-21 @ 05:26) (124/74 - 161/82)  RR: 18 (05-12-21 @ 05:26) (18 - 18)  SpO2: 97% (05-12-21 @ 05:26) (96% - 97%)  Wt(kg): --  I&O's Summary    11 May 2021 07:01  -  12 May 2021 07:00  --------------------------------------------------------  IN: 780 mL / OUT: 0 mL / NET: 780 mL        Appearance: Normal	  HEENT:   Normal oral mucosa, PERRL, EOMI	  Lymphatic: No lymphadenopathy  Cardiovascular: Normal S1 S2, No JVD, + murmurs, No edema  Respiratory: Lungs clear to auscultation	  Psychiatry: A & O x 3, Mood & affect appropriate  Gastrointestinal:  Soft, Non-tender, + BS	  Skin: No rashes, No ecchymoses, No cyanosis	  Neurologic: Non-focal  Extremities: Normal range of motion, No clubbing, cyanosis or edema  Vascular: Peripheral pulses palpable 2+ bilaterally    MEDICATIONS  (STANDING):  apixaban 5 milliGRAM(s) Oral two times a day  aspirin enteric coated 81 milliGRAM(s) Oral daily  atorvastatin 20 milliGRAM(s) Oral at bedtime  DULoxetine 60 milliGRAM(s) Oral daily  levothyroxine 50 MICROGram(s) Oral daily  metoprolol tartrate 100 milliGRAM(s) Oral two times a day  senna 2 Tablet(s) Oral at bedtime  sodium chloride 0.9%. 1000 milliLiter(s) (50 mL/Hr) IV Continuous <Continuous>      TELEMETRY: 	    ECG:  	  RADIOLOGY:  OTHER: 	  	  LABS:	 	    CARDIAC MARKERS:            05-12    139  |  104  |  19  ----------------------------<  108<H>  4.0   |  21<L>  |  0.87    Ca    9.1      12 May 2021 06:02      proBNP:   Lipid Profile: Cholesterol 120  LDL --  HDL 40  TG 63    HgA1c:   TSH: Thyroid Stimulating Hormone, Serum: 1.88 uIU/mL (05-04 @ 09:19)  Thyroid Stimulating Hormone, Serum: 1.59 uIU/mL (05-03 @ 20:45)    < from: CT Angio Heart and Coronaries w/ IV Cont (05.11.21 @ 16:38) >  1.  No significant stenosis.  2.  Minimal luminal narrowing of the mid right coronary artery (less than 30%).    < end of copied text >        Assessment and plan  ---------------------------  82 yo female PMH htn, hld, dm, anxiety, hypothyroidism presenting with feeling of anxiety and chest discomfort starting yesterday. Pt states she may have had spicy food and afterwards started to feel gas-like pressure in the center of her chest which would resolve with burping, however sensation would return. Today pt also felt as if she had a panic attack which she describes as a feeling as if she needs to go to sleep and lay down but unable to 2/2 racing thoughts. No cardiac workup in the past. Pt otherwise feels well no active CP currently. took on baby aspirin today. Pt states shes mostly concerned about her anxiety which she usually feels daily and has been worse over the past 6 months. Pt follows with psychiatrist and pcp regularly. no fevers, chills, nausea, vomiting, abdominal pain, diarrhea, constipation.   pt with sig cardiac risk factor with hx of htn increase cholesterol with palpitation and new onset of a.fib.  tele noted converted to NSR  beta blocker to control hr, continue 50 mg tid  tsh  lipid panel  cardiac enzymes are all negative  echo  will consider ischemia work up based on echo results  cta r/o PE negative  continue AC ?switch to apixaban  stress test, + will need cath or will consider cardiac ct, discussed with pt does not want cath ?false positive stress test  increase metoprolol to 100 mg bid  cta heart negative  fu with me in 2 weeks

## 2021-05-12 NOTE — DISCHARGE NOTE PROVIDER - NSDCCPCAREPLAN_GEN_ALL_CORE_FT
PRINCIPAL DISCHARGE DIAGNOSIS  Diagnosis: Atrial fibrillation, unspecified type  Assessment and Plan of Treatment: new      SECONDARY DISCHARGE DIAGNOSES  Diagnosis: Type 2 diabetes mellitus  Assessment and Plan of Treatment:      PRINCIPAL DISCHARGE DIAGNOSIS  Diagnosis: Atrial fibrillation, unspecified type  Assessment and Plan of Treatment: new diagnosis  Atrial fibrillation is the most common heart rhythm problem.  The condition puts you at risk for has stroke and heart attack  It helps if you control your blood pressure, not drink more than 1-2 alcohol drinks per day, cut down on caffeine, getting treatment for over active thyroid gland, and get regular exercise  Call your doctor if you feel your heart racing or beating unusually, chest tightness or pain, lightheaded, faint, shortness of breath especially with exercise  It is important to take your heart medication as prescribed  You may be on anticoagulation which is very important to take as directed - you may need blood work to monitor drug levels        SECONDARY DISCHARGE DIAGNOSES  Diagnosis: Type 2 diabetes mellitus  Assessment and Plan of Treatment: HgA1C this admission.  Make sure you get your HgA1c checked every three months.  If you take oral diabetes medications, check your blood glucose two times a day.  If you take insulin, check your blood glucose before meals and at bedtime.  It's important not to skip any meals.  Keep a log of your blood glucose results and always take it with you to your doctor appointments.  Keep a list of your current medications including injectables and over the counter medications and bring this medication list with you to all your doctor appointments.  If you have not seen your ophthalmologist this year call for appointment.  Check your feet daily for redness, sores, or openings. Do not self treat. If no improvement in two days call your primary care physician for an appointment.  Low blood sugar (hypoglycemia) is a blood sugar below 70mg/dl. Check your blood sugar if you feel signs/symptoms of hypoglycemia. If your blood sugar is below 70 take 15 grams of carbohydrates (ex 4 oz of apple juice, 3-4 glucose tablets, or 4-6 oz of regular soda) wait 15 minutes and repeat blood sugar to make sure it comes up above 70.  If your blood sugar is above 70 and you are due for a meal, have a meal.  If you are not due for a meal have a snack.  This snack helps keeps your blood sugar at a safe range.

## 2021-05-12 NOTE — DISCHARGE NOTE PROVIDER - PROVIDER TOKENS
PROVIDER:[TOKEN:[[id number]],FOLLOWUP:[2 weeks]],PROVIDER:[TOKEN:[[id number]],FOLLOWUP:[Routine]]

## 2021-05-12 NOTE — DISCHARGE NOTE PROVIDER - NSDCMRMEDTOKEN_GEN_ALL_CORE_FT
apixaban 5 mg oral tablet: 1 tab(s) orally 2 times a day  aspirin 81 mg oral delayed release tablet: 1 tab(s) orally once a day  docusate sodium 100 mg oral capsule: 1 cap(s) orally once a day  DULoxetine 60 mg oral delayed release capsule: 1 cap(s) orally once a day  levothyroxine 50 mcg (0.05 mg) oral tablet: 1 tab(s) orally once a day  losartan 100 mg oral tablet: 1 tab(s) orally once a day  metFORMIN 500 mg oral tablet: 1 tab(s) orally 2 times a day  Metoprolol Succinate ER 50 mg oral tablet, extended release: 1 tab(s) orally 2 times a day  polyethylene glycol 3350 oral powder for reconstitution: 17 gram(s) orally once a day, As needed, Constipation  rosuvastatin 5 mg oral tablet: 1 tab(s) orally once a day   apixaban 5 mg oral tablet: 1 tab(s) orally 2 times a day  aspirin 81 mg oral delayed release tablet: 1 tab(s) orally once a day  docusate sodium 100 mg oral capsule: 1 cap(s) orally once a day  DULoxetine 60 mg oral delayed release capsule: 1 cap(s) orally once a day  levothyroxine 50 mcg (0.05 mg) oral tablet: 1 tab(s) orally once a day  metFORMIN 500 mg oral tablet: 1 tab(s) orally 2 times a day  metoprolol tartrate 100 mg oral tablet: 1 tab(s) orally 2 times a day  polyethylene glycol 3350 oral powder for reconstitution: 17 gram(s) orally once a day, As needed, Constipation  rosuvastatin 5 mg oral tablet: 1 tab(s) orally once a day  senna oral tablet: 2 tab(s) orally once a day (at bedtime)

## 2021-05-12 NOTE — DISCHARGE NOTE PROVIDER - DETAILS OF MALNUTRITION DIAGNOSIS/DIAGNOSES
This patient has been assessed with a concern for Malnutrition and was treated during this hospitalization for the following Nutrition diagnosis/diagnoses:     -  05/10/2021: Morbid obesity (BMI > 40)

## 2021-05-12 NOTE — DISCHARGE NOTE PROVIDER - CARE PROVIDER_API CALL
Bee Gimenez  CARDIOVASCULAR DISEASE  [address], [address]  [place] [address]  Phone: [phone]  Fax: [phone]  Follow Up Time: 2 weeks    Krystina Boucher)  Internal Medicine  [address] [place] [id number]  Phone: [phone]  Fax: [phone]  Follow Up Time: Routine

## 2021-05-12 NOTE — PROGRESS NOTE ADULT - ASSESSMENT
82 yo female   PMH htn, hld, dm, anxiety, hypothyroidism   presenting with feeling of anxiety and cp       felt as if she had a panic attack No cardiac workup in the past.     : New onset A.fib.,  now in nsr      tele. CHADVAC score 3.     on   Lovenox 110 mg bid, transition to NOAC later   tele, pt in NSR now  DM, follow  fs,  on   Synthroid  50 mcg   CT  angio chest/, no PE/    echo , normal ef   positive  stress  test/ [pe r card,  CT with  coronaries/  vx  cath,  defer to  card    anemia,  hb stable,  / last  colonoscopy was  5 yrs ago  pt refusing  cath/  has  no  peripheral  access,  therefore  awiating  mid  line.  eliquis  on  hold    ct coronary  , no cad   cleared  by card for  d/c

## 2021-05-12 NOTE — DISCHARGE NOTE PROVIDER - HOSPITAL COURSE
emale PMH htn, hld, dm, anxiety, hypothyroidism presenting with feeling of anxiety and chest discomfort starting yesterday. Pt states she may have had spicy food and afterwards started to feel gas-like pressure in the center of her chest which would resolve with burping, however sensation would return. Today pt also felt as if she had a panic attack which she describes as a feeling as if she needs to go to sleep and lay down but unable to 2/2 racing thoughts. No cardiac workup in the past. Pt otherwise feels well no active CP currently. took on baby aspirin today. Pt states shes mostly concerned about her anxiety which she usually feels daily and has been worse over the past 6 months. Pt follows with psychiatrist and pcp regularly. no fevers, chills, nausea, vomiting, abdominal pain, diarrhea, constipation.   pt with sig cardiac risk factor with hx of htn increase cholesterol with palpitation and new onset of a.fib.  tele noted converted to NSR  beta blocker to control hr, continue 50 mg tid  tsh  lipid panel  cardiac enzymes are all negative  echo  will consider ischemia work up based on echo results  cta r/o PE negative   emale PMH htn, hld, dm, anxiety, hypothyroidism presenting with feeling of anxiety and chest discomfort starting yesterday. Pt states she may have had spicy food and afterwards started to feel gas-like pressure in the center of her chest which would resolve with burping, however sensation would return. Today pt also felt as if she had a panic attack which she describes as a feeling as if she needs to go to sleep and lay down but unable to 2/2 racing thoughts. No cardiac workup in the past. Pt otherwise feels well no active CP currently. took on baby aspirin today. Pt states shes mostly concerned about her anxiety which she usually feels daily and has been worse over the past 6 months. Pt follows with psychiatrist and pcp regularly. no fevers, chills, nausea, vomiting, abdominal pain, diarrhea, constipation.   pt with sig cardiac risk factor with hx of htn increase cholesterol with palpitation and new onset of a.fib.  tele noted converted to NSR  beta blocker to control hr, continue 50 mg tid  tsh  lipid panel  cardiac enzymes are all negative  echo  will consider ischemia work up based on echo results  cta r/o PE negative  continue AC ?switch to apixaban  stress test, + will need cath or will consider cardiac ct, discussed with pt does not want cath ?false positive stress test  increase metoprolol to 100 mg bid  cta heart negative  fu with me in 2 weeks         82 yo female   PMH htn, hld, dm, anxiety, hypothyroidism   presenting with feeling of anxiety and cp       felt as if she had a panic attack No cardiac workup in the past.    etiology of cp remains unclear/ ?  panic attack/ h/o anxiety     : New onset A.fib.,  now in nsr, on eliquis      tele. CHADVAC score 3.     on   Lovenox 110 mg bid, transition to NOAC later   tele, pt in NSR now  DM, follow  fs,  on   Synthroid  50 mcg   CT  angio chest/, no PE/    echo , normal ef   positive  stress  test/ [pe r card,  CT with  coronaries/  vx  cath,    anemia,  hb stable,  / last  colonoscopy was  5 yrs ago  pt refusing  cath/  has  no  peripheral  access,  therefore   needing  mid  line.      ct coronary  , no cad

## 2021-05-12 NOTE — PROGRESS NOTE ADULT - SUBJECTIVE AND OBJECTIVE BOX
REVIEW OF SYSTEMS:  GEN: no fever,    no chills  RESP: no SOB,   no cough  CVS: no chest pain,   no palpitations  GI: no abdominal pain,   no nausea,   no vomiting,   no constipation,   no diarrhea  : no dysuria,   no frequency  NEURO: no headache,   no dizziness  PSYCH: no depression,   not anxious  Derm : no rash    MEDICATIONS  (STANDING):  apixaban 5 milliGRAM(s) Oral two times a day  aspirin enteric coated 81 milliGRAM(s) Oral daily  atorvastatin 20 milliGRAM(s) Oral at bedtime  DULoxetine 60 milliGRAM(s) Oral daily  levothyroxine 50 MICROGram(s) Oral daily  metoprolol tartrate 100 milliGRAM(s) Oral two times a day  senna 2 Tablet(s) Oral at bedtime  sodium chloride 0.9%. 1000 milliLiter(s) (50 mL/Hr) IV Continuous <Continuous>    MEDICATIONS  (PRN):  polyethylene glycol 3350 17 Gram(s) Oral daily PRN Constipation      Vital Signs Last 24 Hrs  T(C): 36.8 (12 May 2021 05:26), Max: 36.8 (12 May 2021 05:26)  T(F): 98.3 (12 May 2021 05:26), Max: 98.3 (12 May 2021 05:26)  HR: 72 (12 May 2021 05:26) (70 - 93)  BP: 129/76 (12 May 2021 05:26) (124/74 - 161/82)  BP(mean): --  RR: 18 (12 May 2021 05:26) (18 - 18)  SpO2: 97% (12 May 2021 05:26) (96% - 97%)  CAPILLARY BLOOD GLUCOSE      POCT Blood Glucose.: 104 mg/dL (12 May 2021 07:33)  POCT Blood Glucose.: 121 mg/dL (11 May 2021 21:24)  POCT Blood Glucose.: 98 mg/dL (11 May 2021 17:13)  POCT Blood Glucose.: 97 mg/dL (11 May 2021 11:54)    I&O's Summary    11 May 2021 07:01  -  12 May 2021 07:00  --------------------------------------------------------  IN: 780 mL / OUT: 0 mL / NET: 780 mL        PHYSICAL EXAM:  HEAD:  Atraumatic, Normocephalic  NECK: Supple, No   JVD  CHEST/LUNG:   no     rales,     no,    rhonchi  HEART: Regular rate and rhythm;         murmur  ABDOMEN: Soft, Nontender, ;   EXTREMITIES:    no    edema  NEUROLOGY:  alert    LABS:    05-12    139  |  104  |  19  ----------------------------<  108<H>  4.0   |  21<L>  |  0.87    Ca    9.1      12 May 2021 06:02                      Thyroid Stimulating Hormone, Serum: 1.88 uIU/mL (05-04 @ 09:19)          Consultant(s) Notes Reviewed:      Care Discussed with Consultants/Other Providers:

## 2021-05-12 NOTE — DISCHARGE NOTE NURSING/CASE MANAGEMENT/SOCIAL WORK - PATIENT PORTAL LINK FT
You can access the FollowMyHealth Patient Portal offered by [facility] by registering at the following website: [email or web address]. By joining [facility]s FollowMyHealth portal, you will also be able to view your health information using other applications (apps) compatible with our system.

## 2021-05-12 NOTE — PROGRESS NOTE ADULT - NUTRITIONAL ASSESSMENT
This patient has been assessed with a concern for Malnutrition and has been determined to have a diagnosis/diagnoses of Morbid obesity (BMI > 40).    This patient is being managed with:   Diet Consistent Carbohydrate/No Snacks-  DASH/TLC {Sodium & Cholesterol Restricted} (DASH)  No Caffeine  Entered: May 10 2021  3:21PM    
This patient has been assessed with a concern for Malnutrition and has been determined to have a diagnosis/diagnoses of Morbid obesity (BMI > 40).    This patient is being managed with:   Diet Consistent Carbohydrate/No Snacks-  DASH/TLC {Sodium & Cholesterol Restricted} (DASH)  No Caffeine  Entered: May 10 2021  3:21PM

## 2021-05-12 NOTE — PROGRESS NOTE ADULT - REASON FOR ADMISSION
Anxiety with rapid heart rate.

## 2021-05-12 NOTE — PROGRESS NOTE ADULT - NSICDXPILOT_GEN_ALL_CORE
[place]

## 2021-06-07 ENCOUNTER — APPOINTMENT (OUTPATIENT)
Dept: ORTHOPEDIC SURGERY | Facility: CLINIC | Age: 81
End: 2021-06-07

## 2025-03-27 NOTE — ED PROVIDER NOTE - CLINICAL SUMMARY MEDICAL DECISION MAKING FREE TEXT BOX
"The following home medications were NOT continued on inpatient admission per \"Discontinuation of nonessential home medications during hospitalization\" policy: iron    If a therapeutic holiday is deemed inappropriate per the prescriber, please notify the pharmacist regarding the medication order.    The pharmacist is available to answer any questions and/or concerns the patient may have regarding discontinuation of non-essential medications.    Please ensure that these medications are restarted as needed upon discharge via the medication reconciliation discharge process and included on the discharge medication reconciliation report.    Thank you,  Kelly Robins RPH    " see attending attestation